# Patient Record
Sex: MALE | Race: WHITE | NOT HISPANIC OR LATINO | Employment: PART TIME | URBAN - METROPOLITAN AREA
[De-identification: names, ages, dates, MRNs, and addresses within clinical notes are randomized per-mention and may not be internally consistent; named-entity substitution may affect disease eponyms.]

---

## 2018-02-15 DIAGNOSIS — E29.1 HYPOGONADISM IN MALE: Primary | ICD-10-CM

## 2018-12-03 LAB
ALBUMIN SERPL-MCNC: 4.6 G/DL (ref 3.6–4.8)
ALBUMIN/GLOB SERPL: 1.9 {RATIO} (ref 1.2–2.2)
ALP SERPL-CCNC: 61 IU/L (ref 39–117)
ALT SERPL-CCNC: 91 IU/L (ref 0–44)
AST SERPL-CCNC: 101 IU/L (ref 0–40)
BILIRUB SERPL-MCNC: 0.8 MG/DL (ref 0–1.2)
BUN SERPL-MCNC: 19 MG/DL (ref 8–27)
BUN/CREAT SERPL: 16 (ref 10–24)
CALCIUM SERPL-MCNC: 11.9 MG/DL (ref 8.6–10.2)
CHLORIDE SERPL-SCNC: 98 MMOL/L (ref 96–106)
CHOLEST SERPL-MCNC: 172 MG/DL (ref 100–199)
CO2 SERPL-SCNC: 27 MMOL/L (ref 20–29)
CREAT SERPL-MCNC: 1.22 MG/DL (ref 0.76–1.27)
GLOBULIN SER-MCNC: 2.4 G/DL (ref 1.5–4.5)
GLUCOSE SERPL-MCNC: 214 MG/DL (ref 65–99)
HCT VFR BLD AUTO: 40.5 % (ref 37.5–51)
HDLC SERPL-MCNC: 78 MG/DL
HGB BLD-MCNC: 14.3 G/DL (ref 13–17.7)
LABCORP COMMENT: NORMAL
LDLC SERPL CALC-MCNC: 79 MG/DL (ref 0–99)
LDLC/HDLC SERPL: 1 RATIO (ref 0–3.6)
POTASSIUM SERPL-SCNC: 4.5 MMOL/L (ref 3.5–5.2)
PROLACTIN SERPL-MCNC: 7.8 NG/ML (ref 4–15.2)
PROT SERPL-MCNC: 7 G/DL (ref 6–8.5)
PSA SERPL-MCNC: 0.6 NG/ML (ref 0–4)
SL AMB EGFR AFRICAN AMERICAN: 70 ML/MIN/1.73
SL AMB EGFR NON AFRICAN AMERICAN: 61 ML/MIN/1.73
SL AMB VLDL CHOLESTEROL CALC: 15 MG/DL (ref 5–40)
SODIUM SERPL-SCNC: 144 MMOL/L (ref 134–144)
TESTOST FREE SERPL-MCNC: 16.4 PG/ML (ref 6.6–18.1)
TESTOST SERPL-MCNC: 421 NG/DL (ref 264–916)
TRIGL SERPL-MCNC: 74 MG/DL (ref 0–149)

## 2018-12-10 ENCOUNTER — TELEPHONE (OUTPATIENT)
Dept: ENDOCRINOLOGY | Facility: HOSPITAL | Age: 68
End: 2018-12-10

## 2018-12-10 NOTE — TELEPHONE ENCOUNTER
[facility]  It looks like elevation in calcium is relatively new finding and we should evaluate it sooner  Can we add him to an 11:50 spot this Thursday or Friday?

## 2018-12-12 ENCOUNTER — OFFICE VISIT (OUTPATIENT)
Dept: ENDOCRINOLOGY | Facility: HOSPITAL | Age: 68
End: 2018-12-12
Payer: MEDICARE

## 2018-12-12 VITALS
WEIGHT: 269.6 LBS | HEART RATE: 104 BPM | SYSTOLIC BLOOD PRESSURE: 132 MMHG | DIASTOLIC BLOOD PRESSURE: 90 MMHG | BODY MASS INDEX: 34.6 KG/M2 | HEIGHT: 74 IN

## 2018-12-12 DIAGNOSIS — Z86.018 HISTORY OF PROLACTINOMA: ICD-10-CM

## 2018-12-12 DIAGNOSIS — E29.1 HYPOGONADISM IN MALE: ICD-10-CM

## 2018-12-12 DIAGNOSIS — E83.52 HYPERCALCEMIA: Primary | ICD-10-CM

## 2018-12-12 DIAGNOSIS — E11.9 TYPE 2 DIABETES MELLITUS WITHOUT COMPLICATION, WITHOUT LONG-TERM CURRENT USE OF INSULIN (HCC): ICD-10-CM

## 2018-12-12 PROCEDURE — 99204 OFFICE O/P NEW MOD 45 MIN: CPT | Performed by: INTERNAL MEDICINE

## 2018-12-12 RX ORDER — TESTOSTERONE 30 MG/1.5ML
SOLUTION TOPICAL
Refills: 0 | COMMUNITY
Start: 2018-09-10 | End: 2018-12-27 | Stop reason: SDUPTHER

## 2018-12-12 RX ORDER — SIMVASTATIN 40 MG
40 TABLET ORAL DAILY
Refills: 2 | COMMUNITY
Start: 2018-10-27

## 2018-12-12 RX ORDER — VALSARTAN AND HYDROCHLOROTHIAZIDE 320; 25 MG/1; MG/1
1 TABLET, FILM COATED ORAL DAILY
Refills: 3 | COMMUNITY
Start: 2018-11-02 | End: 2021-07-19

## 2018-12-12 RX ORDER — ASPIRIN 81 MG/1
81 TABLET ORAL DAILY
COMMUNITY

## 2018-12-12 NOTE — LETTER
December 12, 2018     Juancho Long MD  [address]  [facility]  [address]  [address] [id number]    Patient: Eliceo Roche   YOB: 1950   Date of Visit: 12/12/2018       Dear Dr Theda Cheadle:    Thank you for referring Victorina Hayes to me for evaluation  Below are my notes for this consultation  If you have questions, please do not hesitate to call me  I look forward to following your patient along with you  Sincerely,        Ezequiel Malhotra DO        CC: No Recipients  Ezequiel Malhotra DO  12/12/2018  9:58 AM  Sign at close encounter  12/12/2018    Assessment/Plan      Diagnoses and all orders for this visit:    Hypercalcemia  -     Comprehensive metabolic panel Lab Collect  -     PTH, intact- Lab Collect  -     Vitamin D 25 hydroxy Lab Collect  -     Phosphorus Lab Collect    History of prolactinoma    Hypogonadism in male    Type 2 diabetes mellitus without complication, without long-term current use of insulin (HCC)  -     HEMOGLOBIN A1C W/ EAG ESTIMATION Lab Collect    Other orders  -     metFORMIN (GLUCOPHAGE) 1000 MG tablet; Take 1,000 mg by mouth 2 (two) times a day with meals  -     simvastatin (ZOCOR) 40 mg tablet; Take 40 mg by mouth daily  -     Testosterone 30 MG/ACT SOLN; APPLY 2 DEPRESSIONS OF PUMP UNDER 1 ARM & 1 DEPRESSION OF PUMP UNDER OTHER ARM DAILY, & TITRATE UP A  -     valsartan-hydrochlorothiazide (DIOVAN-HCT) 320-25 MG per tablet; Take 1 tablet by mouth daily  -     aspirin (ECOTRIN LOW STRENGTH) 81 mg EC tablet; Take 81 mg by mouth daily        Assessment/Plan:  1  Hypercalcemia:  Discussed with the patient that we need to define the cause of hypercalcemia  I will ask him to get a CMP, 25 hydroxy vitamin-D, PTH level done soon and we will call with the results  I discussed with him that I a m  suspicious of primary hyperparathyroidism in the setting of his history of prolactinoma, family history of prolactinoma, family history of kidney stones    In this regard, because of the elevation of his recent calcium level and is personal history of kidney stone, if this is the etiology I would likely suggest that he be seen for surgical evaluation as he already would have to surgical indications  He is familiar with Dr Vince Joiner a [address] as his wife had parathyroid surgery through him as well  If this is the cause of his hypercalcemia, we will place a referral and order an imaging test for localization  If it looks non parathyroid mediated, discussed with him that we will need to do further testing in this regard  We will be in touch with him  Also, in the setting of 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 as well as workup for hypercalcemia, depending on the cause of hypercalcemia we may need to consider genetic testing for underlying her hereditary endocrinopathy such as MEN  2   Prolactinoma history:   His prolactin level remains stable off of dopamine agonist therapy  We will plan to monitor this on yearly basis or if symptoms start  3   Hypogonadism:  Clinically and biochemically doing well on testosterone 30 mg per actuation, 2 depressions daily  We will need to repeat blood work at least once a year  4   Type 2 diabetes:  Recent blood sugar was elevated at above 200  With upcoming blood work for Calcium evaluation, I have added an A1c  Based on these results I discussed different options  If it looks appropriate, we will consider adding Jardiance 25 mg daily to his regimen  Discussed the different side effects of Jardiance so he is aware should we be planning to start this medication        CC:   History of pituitary tumor, hypogonadism, hypercalcemia new patient    History of Present Illness     HPI: Dennis Reyes is a 80y o  year old male with history of prolactinoma treated with dopamine agonist therapy for a number years that was stopped several years ago as his prolactin levels did not rise and not August of 2017 MRI showed no evidence of pituitary mass although this study was not done with contrast   He also has a history of hypertension, diabetes, hyperlipidemia, hypogonadism  Hypogonadism did not improve with correction of prolactin so he is maintained on testosterone supplementation  He is also found to have hypercalcemia in recent blood work  He presents today to establish care after follow with doctor Duyen  He presents today for a earlier follow-up appointment as blood work showed hypercalcemia of 11 8  Overall he feels well and denies being told he has hypercalcemia in the past   He does have history of multiple kidney stones with the most recent 1 being over a few years ago  He does endorse a brother who has a history of prolactinoma  He also notes his father and another family member had a history of recurrent kidney stones  He denies any known pancreas or adrenal neoplasia  He denies any fragility fractures or known history of osteopenia or osteoporosis  For hypogonadism he is maintained on 2 depressions of testosterone 30 mg per actuation daily and reports clinically feeling well  He also has history of type 2 diabetes for the past 3-4 years managed on metformin 1000 mg twice a day without any known complications without hypoglycemia  He denies any polyuria polydipsia  He does note that his wife notes that he has room for improvement in terms of diet  I did make an where we have a dietitian here if he is interested in meeting with regard to medical nutrition therapy  Review of Systems   Constitutional: Negative for fatigue  HENT: Negative for trouble swallowing and voice change  Eyes: Negative for visual disturbance  Respiratory: Negative for shortness of breath  Cardiovascular: Negative for palpitations and leg swelling  Gastrointestinal: Negative for abdominal pain, nausea and vomiting  Endocrine: Negative for cold intolerance, heat intolerance, polydipsia and polyuria     Musculoskeletal: Negative for arthralgias and myalgias  Skin: Negative for rash  Neurological: Negative for dizziness, tremors and weakness  Hematological: Negative for adenopathy  Psychiatric/Behavioral: Negative for agitation and confusion  Historical Information   History reviewed  No pertinent past medical history  History reviewed  No pertinent surgical history  Social History   History   Alcohol use Not on file     History   Drug use: Unknown     History   Smoking Status    Former Smoker    Packs/day: 0 50    Types: Cigarettes    Quit date: 2001   Smokeless Tobacco    Never Used     Family History:   Family History   Problem Relation Age of Onset    Breast cancer Mother     Alzheimer's disease Mother     No Known Problems Father     Diabetes unspecified Brother        Meds/Allergies   Current Outpatient Prescriptions   Medication Sig Dispense Refill    aspirin (ECOTRIN LOW STRENGTH) 81 mg EC tablet Take 81 mg by mouth daily      metFORMIN (GLUCOPHAGE) 1000 MG tablet Take 1,000 mg by mouth 2 (two) times a day with meals  1    simvastatin (ZOCOR) 40 mg tablet Take 40 mg by mouth daily  2    Testosterone 30 MG/ACT SOLN APPLY 2 DEPRESSIONS OF PUMP UNDER 1 ARM & 1 DEPRESSION OF PUMP UNDER OTHER ARM DAILY, & TITRATE UP A  0    valsartan-hydrochlorothiazide (DIOVAN-HCT) 320-25 MG per tablet Take 1 tablet by mouth daily  3     No current facility-administered medications for this visit  Allergies   Allergen Reactions    Contrast [Iodinated Diagnostic Agents] Shortness Of Breath and Itching       Objective   Vitals: Blood pressure 132/90, pulse 104, height 6' 2" (1 88 m), weight 122 kg (269 lb 9 6 oz)  Invasive Devices          No matching active lines, drains, or airways          Physical Exam   Constitutional: He is oriented to person, place, and time  He appears well-developed and well-nourished  No distress  HENT:   Head: Normocephalic and atraumatic     Eyes: Pupils are equal, round, and reactive to light  Conjunctivae are normal    Neck: Normal range of motion  Neck supple  No thyromegaly present  Cardiovascular: Normal rate and regular rhythm  No murmur heard  Pulmonary/Chest: Effort normal and breath sounds normal  No respiratory distress  Abdominal: Soft  Bowel sounds are normal  He exhibits no distension  Musculoskeletal: Normal range of motion  He exhibits no edema  Neurological: He is alert and oriented to person, place, and time  He exhibits normal muscle tone  Skin: Skin is warm and dry  No rash noted  He is not diaphoretic  Psychiatric: He has a normal mood and affect  His behavior is normal        The history was obtained from the review of the chart and from the patient      Lab Results:      Recent Results (from the past 17930 hour(s))   Comprehensive metabolic panel    Collection Time: 11/30/18  9:52 AM   Result Value Ref Range    Glucose, Random 214 (H) 65 - 99 mg/dL    BUN 19 8 - 27 mg/dL    Creatinine 1 22 0 76 - 1 27 mg/dL    eGFR Non  61 >59 mL/min/1 73    SL AMB EGFR AFRICAN AMERICAN 70 >59 mL/min/1 73    SL AMB BUN/CREATININE RATIO 16 10 - 24    Sodium 144 134 - 144 mmol/L    Potassium 4 5 3 5 - 5 2 mmol/L    Chloride 98 96 - 106 mmol/L    CO2 27 20 - 29 mmol/L    CALCIUM 11 9 (H) 8 6 - 10 2 mg/dL    SL AMB PROTEIN, TOTAL 7 0 6 0 - 8 5 g/dL    Albumin 4 6 3 6 - 4 8 g/dL    Globulin, Total 2 4 1 5 - 4 5 g/dL    Albumin/Globulin Ratio 1 9 1 2 - 2 2    TOTAL BILIRUBIN 0 8 0 0 - 1 2 mg/dL    Alk Phos Isoenzymes 61 39 - 117 IU/L    SL AMB  (H) 0 - 40 IU/L    SL AMB ALT 91 (H) 0 - 44 IU/L   Lipid Panel with Direct LDL reflex    Collection Time: 11/30/18  9:52 AM   Result Value Ref Range    Cholesterol, Total 172 100 - 199 mg/dL    Triglycerides 74 0 - 149 mg/dL    HDL 78 >39 mg/dL    VLDL Cholesterol Calculated 15 5 - 40 mg/dL    LDL Direct 79 0 - 99 mg/dL    LDl/HDL Ratio 1 0 0 0 - 3 6 ratio   Hemoglobin and hematocrit, blood    Collection Time: 11/30/18 9:52 AM   Result Value Ref Range    Hemoglobin 14 3 13 0 - 17 7 g/dL    HCT 40 5 37 5 - 51 0 %   Testosterone, free, total    Collection Time: 11/30/18  9:52 AM   Result Value Ref Range    TESTOSTERONE TOTAL 421 264 - 916 ng/dL    Testosterone, Free 16 4 6 6 - 18 1 pg/mL   Prolactin    Collection Time: 11/30/18  9:52 AM   Result Value Ref Range    Prolactin 7 8 4 0 - 15 2 ng/mL   PSA Total, Diagnostic    Collection Time: 11/30/18  9:52 AM   Result Value Ref Range    Prostate Specific Antigen Total 0 6 0 0 - 4 0 ng/mL   Specimen Status Report    Collection Time: 11/30/18  9:52 AM   Result Value Ref Range    Comment Comment          No future appointments  Portions of the record may have been created with voice recognition software  Occasional wrong word or "sound a like" substitutions may have occurred due to the inherent limitations of voice recognition software  Read the chart carefully and recognize, using context, where substitutions have occurred

## 2018-12-12 NOTE — PROGRESS NOTES
12/12/2018    Assessment/Plan      Diagnoses and all orders for this visit:    Hypercalcemia  -     Comprehensive metabolic panel Lab Collect  -     PTH, intact- Lab Collect  -     Vitamin D 25 hydroxy Lab Collect  -     Phosphorus Lab Collect    History of prolactinoma    Hypogonadism in male    Type 2 diabetes mellitus without complication, without long-term current use of insulin (HCC)  -     HEMOGLOBIN A1C W/ EAG ESTIMATION Lab Collect    Other orders  -     metFORMIN (GLUCOPHAGE) 1000 MG tablet; Take 1,000 mg by mouth 2 (two) times a day with meals  -     simvastatin (ZOCOR) 40 mg tablet; Take 40 mg by mouth daily  -     Testosterone 30 MG/ACT SOLN; APPLY 2 DEPRESSIONS OF PUMP UNDER 1 ARM & 1 DEPRESSION OF PUMP UNDER OTHER ARM DAILY, & TITRATE UP A  -     valsartan-hydrochlorothiazide (DIOVAN-HCT) 320-25 MG per tablet; Take 1 tablet by mouth daily  -     aspirin (ECOTRIN LOW STRENGTH) 81 mg EC tablet; Take 81 mg by mouth daily        Assessment/Plan:  1  Hypercalcemia:  Discussed with the patient that we need to define the cause of hypercalcemia  I will ask him to get a CMP, 25 hydroxy vitamin-D, PTH level done soon and we will call with the results  I discussed with him that I a m  suspicious of primary hyperparathyroidism in the setting of his history of prolactinoma, family history of prolactinoma, family history of kidney stones  In this regard, because of the elevation of his recent calcium level and is personal history of kidney stone, if this is the etiology I would likely suggest that he be seen for surgical evaluation as he already would have to surgical indications  He is familiar with Dr Malik Hadley a [address] as his wife had parathyroid surgery through him as well  If this is the cause of his hypercalcemia, we will place a referral and order an imaging test for localization    If it looks non parathyroid mediated, discussed with him that we will need to do further testing in this regard  We will be in touch with him  Also, in the setting of 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 as well as workup for hypercalcemia, depending on the cause of hypercalcemia we may need to consider genetic testing for underlying her hereditary endocrinopathy such as MEN  2   Prolactinoma history:   His prolactin level remains stable off of dopamine agonist therapy  We will plan to monitor this on yearly basis or if symptoms start  3   Hypogonadism:  Clinically and biochemically doing well on testosterone 30 mg per actuation, 2 depressions daily  We will need to repeat blood work at least once a year  4   Type 2 diabetes:  Recent blood sugar was elevated at above 200  With upcoming blood work for Calcium evaluation, I have added an A1c  Based on these results I discussed different options  If it looks appropriate, we will consider adding Jardiance 25 mg daily to his regimen  Discussed the different side effects of Jardiance so he is aware should we be planning to start this medication  CC:   History of pituitary tumor, hypogonadism, hypercalcemia new patient    History of Present Illness     HPI: Handy Sapp is a 80y o  year old male with history of prolactinoma treated with dopamine agonist therapy for a number years that was stopped several years ago as his prolactin levels did not rise and not August of 2017 MRI showed no evidence of pituitary mass although this study was not done with contrast   He also has a history of hypertension, diabetes, hyperlipidemia, hypogonadism  Hypogonadism did not improve with correction of prolactin so he is maintained on testosterone supplementation  He is also found to have hypercalcemia in recent blood work  He presents today to establish care after follow with doctor Geller  He presents today for a earlier follow-up appointment as blood work showed hypercalcemia of 11 8    Overall he feels well and denies being told he has hypercalcemia in the past   He does have history of multiple kidney stones with the most recent 1 being over a few years ago  He does endorse a brother who has a history of prolactinoma  He also notes his father and another family member had a history of recurrent kidney stones  He denies any known pancreas or adrenal neoplasia  He denies any fragility fractures or known history of osteopenia or osteoporosis  For hypogonadism he is maintained on 2 depressions of testosterone 30 mg per actuation daily and reports clinically feeling well  He also has history of type 2 diabetes for the past 3-4 years managed on metformin 1000 mg twice a day without any known complications without hypoglycemia  He denies any polyuria polydipsia  He does note that his wife notes that he has room for improvement in terms of diet  I did make an where we have a dietitian here if he is interested in meeting with regard to medical nutrition therapy  Review of Systems   Constitutional: Negative for fatigue  HENT: Negative for trouble swallowing and voice change  Eyes: Negative for visual disturbance  Respiratory: Negative for shortness of breath  Cardiovascular: Negative for palpitations and leg swelling  Gastrointestinal: Negative for abdominal pain, nausea and vomiting  Endocrine: Negative for cold intolerance, heat intolerance, polydipsia and polyuria  Musculoskeletal: Negative for arthralgias and myalgias  Skin: Negative for rash  Neurological: Negative for dizziness, tremors and weakness  Hematological: Negative for adenopathy  Psychiatric/Behavioral: Negative for agitation and confusion  Historical Information   History reviewed  No pertinent past medical history  History reviewed  No pertinent surgical history    Social History   History   Alcohol use Not on file     History   Drug use: Unknown     History   Smoking Status    Former Smoker    Packs/day: 0 50    Types: Cigarettes    Quit date: 2001 Smokeless Tobacco    Never Used     Family History:   Family History   Problem Relation Age of Onset    Breast cancer Mother     Alzheimer's disease Mother     No Known Problems Father     Diabetes unspecified Brother        Meds/Allergies   Current Outpatient Prescriptions   Medication Sig Dispense Refill    aspirin (ECOTRIN LOW STRENGTH) 81 mg EC tablet Take 81 mg by mouth daily      metFORMIN (GLUCOPHAGE) 1000 MG tablet Take 1,000 mg by mouth 2 (two) times a day with meals  1    simvastatin (ZOCOR) 40 mg tablet Take 40 mg by mouth daily  2    Testosterone 30 MG/ACT SOLN APPLY 2 DEPRESSIONS OF PUMP UNDER 1 ARM & 1 DEPRESSION OF PUMP UNDER OTHER ARM DAILY, & TITRATE UP A  0    valsartan-hydrochlorothiazide (DIOVAN-HCT) 320-25 MG per tablet Take 1 tablet by mouth daily  3     No current facility-administered medications for this visit  Allergies   Allergen Reactions    Contrast [Iodinated Diagnostic Agents] Shortness Of Breath and Itching       Objective   Vitals: Blood pressure 132/90, pulse 104, height 6' 2" (1 88 m), weight 122 kg (269 lb 9 6 oz)  Invasive Devices          No matching active lines, drains, or airways          Physical Exam   Constitutional: He is oriented to person, place, and time  He appears well-developed and well-nourished  No distress  HENT:   Head: Normocephalic and atraumatic  Eyes: Pupils are equal, round, and reactive to light  Conjunctivae are normal    Neck: Normal range of motion  Neck supple  No thyromegaly present  Cardiovascular: Normal rate and regular rhythm  No murmur heard  Pulmonary/Chest: Effort normal and breath sounds normal  No respiratory distress  Abdominal: Soft  Bowel sounds are normal  He exhibits no distension  Musculoskeletal: Normal range of motion  He exhibits no edema  Neurological: He is alert and oriented to person, place, and time  He exhibits normal muscle tone  Skin: Skin is warm and dry  No rash noted   He is not diaphoretic  Psychiatric: He has a normal mood and affect  His behavior is normal        The history was obtained from the review of the chart and from the patient      Lab Results:      Recent Results (from the past 24697 hour(s))   Comprehensive metabolic panel    Collection Time: 11/30/18  9:52 AM   Result Value Ref Range    Glucose, Random 214 (H) 65 - 99 mg/dL    BUN 19 8 - 27 mg/dL    Creatinine 1 22 0 76 - 1 27 mg/dL    eGFR Non  61 >59 mL/min/1 73    SL AMB EGFR AFRICAN AMERICAN 70 >59 mL/min/1 73    SL AMB BUN/CREATININE RATIO 16 10 - 24    Sodium 144 134 - 144 mmol/L    Potassium 4 5 3 5 - 5 2 mmol/L    Chloride 98 96 - 106 mmol/L    CO2 27 20 - 29 mmol/L    CALCIUM 11 9 (H) 8 6 - 10 2 mg/dL    SL AMB PROTEIN, TOTAL 7 0 6 0 - 8 5 g/dL    Albumin 4 6 3 6 - 4 8 g/dL    Globulin, Total 2 4 1 5 - 4 5 g/dL    Albumin/Globulin Ratio 1 9 1 2 - 2 2    TOTAL BILIRUBIN 0 8 0 0 - 1 2 mg/dL    Alk Phos Isoenzymes 61 39 - 117 IU/L    SL AMB  (H) 0 - 40 IU/L    SL AMB ALT 91 (H) 0 - 44 IU/L   Lipid Panel with Direct LDL reflex    Collection Time: 11/30/18  9:52 AM   Result Value Ref Range    Cholesterol, Total 172 100 - 199 mg/dL    Triglycerides 74 0 - 149 mg/dL    HDL 78 >39 mg/dL    VLDL Cholesterol Calculated 15 5 - 40 mg/dL    LDL Direct 79 0 - 99 mg/dL    LDl/HDL Ratio 1 0 0 0 - 3 6 ratio   Hemoglobin and hematocrit, blood    Collection Time: 11/30/18  9:52 AM   Result Value Ref Range    Hemoglobin 14 3 13 0 - 17 7 g/dL    HCT 40 5 37 5 - 51 0 %   Testosterone, free, total    Collection Time: 11/30/18  9:52 AM   Result Value Ref Range    TESTOSTERONE TOTAL 421 264 - 916 ng/dL    Testosterone, Free 16 4 6 6 - 18 1 pg/mL   Prolactin    Collection Time: 11/30/18  9:52 AM   Result Value Ref Range    Prolactin 7 8 4 0 - 15 2 ng/mL   PSA Total, Diagnostic    Collection Time: 11/30/18  9:52 AM   Result Value Ref Range    Prostate Specific Antigen Total 0 6 0 0 - 4 0 ng/mL   Specimen Status Report Collection Time: 11/30/18  9:52 AM   Result Value Ref Range    Comment Comment          No future appointments  Portions of the record may have been created with voice recognition software  Occasional wrong word or "sound a like" substitutions may have occurred due to the inherent limitations of voice recognition software  Read the chart carefully and recognize, using context, where substitutions have occurred

## 2018-12-27 DIAGNOSIS — E29.1 HYPOGONADISM IN MALE: Primary | ICD-10-CM

## 2018-12-27 RX ORDER — TESTOSTERONE 30 MG/1.5ML
SOLUTION TOPICAL
Qty: 90 ML | Refills: 1 | Status: SHIPPED | OUTPATIENT
Start: 2018-12-27 | End: 2019-01-02 | Stop reason: SDUPTHER

## 2018-12-27 NOTE — TELEPHONE ENCOUNTER
Meghna Hart pt called for a refill of his Testosterone to go to [facility] in [facility] He has a follow up in June with Meghna Hart  I would send this to him, but if it wont go over electronically I need it signed

## 2019-01-02 DIAGNOSIS — E29.1 HYPOGONADISM IN MALE: ICD-10-CM

## 2019-01-02 RX ORDER — TESTOSTERONE 30 MG/1.5ML
SOLUTION TOPICAL
Qty: 180 ACT | Refills: 1 | Status: SHIPPED | OUTPATIENT
Start: 2019-01-02 | End: 2019-06-18 | Stop reason: SDUPTHER

## 2019-01-03 LAB
25(OH)D3+25(OH)D2 SERPL-MCNC: 31.1 NG/ML (ref 30–100)
ALBUMIN SERPL-MCNC: 4.4 G/DL (ref 3.6–4.8)
ALBUMIN/GLOB SERPL: 1.7 {RATIO} (ref 1.2–2.2)
ALP SERPL-CCNC: 66 IU/L (ref 39–117)
ALT SERPL-CCNC: 72 IU/L (ref 0–44)
AST SERPL-CCNC: 69 IU/L (ref 0–40)
BILIRUB SERPL-MCNC: 0.6 MG/DL (ref 0–1.2)
BUN SERPL-MCNC: 22 MG/DL (ref 8–27)
BUN/CREAT SERPL: 17 (ref 10–24)
CALCIUM SERPL-MCNC: 10.8 MG/DL (ref 8.6–10.2)
CHLORIDE SERPL-SCNC: 98 MMOL/L (ref 96–106)
CO2 SERPL-SCNC: 24 MMOL/L (ref 20–29)
CREAT SERPL-MCNC: 1.27 MG/DL (ref 0.76–1.27)
EST. AVERAGE GLUCOSE BLD GHB EST-MCNC: 171 MG/DL
GLOBULIN SER-MCNC: 2.6 G/DL (ref 1.5–4.5)
GLUCOSE SERPL-MCNC: 194 MG/DL (ref 65–99)
HBA1C MFR BLD: 7.6 % (ref 4.8–5.6)
LABCORP COMMENT: NORMAL
PHOSPHATE SERPL-MCNC: 3.4 MG/DL (ref 2.5–4.5)
POTASSIUM SERPL-SCNC: 4.5 MMOL/L (ref 3.5–5.2)
PROT SERPL-MCNC: 7 G/DL (ref 6–8.5)
PTH-INTACT SERPL-MCNC: 15 PG/ML (ref 15–65)
SL AMB EGFR AFRICAN AMERICAN: 67 ML/MIN/1.73
SL AMB EGFR NON AFRICAN AMERICAN: 58 ML/MIN/1.73
SODIUM SERPL-SCNC: 140 MMOL/L (ref 134–144)

## 2019-01-04 DIAGNOSIS — E83.52 HYPERCALCEMIA: Primary | ICD-10-CM

## 2019-01-04 DIAGNOSIS — E11.9 TYPE 2 DIABETES MELLITUS WITHOUT COMPLICATION, WITHOUT LONG-TERM CURRENT USE OF INSULIN (HCC): ICD-10-CM

## 2019-03-06 LAB
CREAT ?TM UR-SCNC: 278 UMOL/L
EXT MICROALBUMIN URINE RANDOM: 4.5
HBA1C MFR BLD HPLC: 7.7 %
MICROALBUMIN/CREAT UR: 16 MG/G{CREAT}

## 2019-03-25 LAB
1,25(OH)2D3 SERPL-MCNC: 19.9 PG/ML (ref 19.9–79.3)
25(OH)D3+25(OH)D2 SERPL-MCNC: 37.1 NG/ML (ref 30–100)
ACE SERPL-CCNC: 36 U/L (ref 14–82)
ALBUMIN SERPL ELPH-MCNC: 4.1 G/DL (ref 2.9–4.4)
ALBUMIN/GLOB SERPL: 1.4 {RATIO} (ref 0.7–1.7)
ALPHA1 GLOB SERPL ELPH-MCNC: 0.2 G/DL (ref 0–0.4)
ALPHA2 GLOB SERPL ELPH-MCNC: 0.8 G/DL (ref 0.4–1)
B-GLOBULIN SERPL ELPH-MCNC: 1.2 G/DL (ref 0.7–1.3)
CA-I SERPL ISE-MCNC: 5.4 MG/DL (ref 4.5–5.6)
GAMMA GLOB SERPL ELPH-MCNC: 0.8 G/DL (ref 0.4–1.8)
GLOBULIN SER CALC-MCNC: 3 G/DL (ref 2.2–3.9)
LABORATORY COMMENT REPORT: NORMAL
M PROTEIN SERPL ELPH-MCNC: NORMAL G/DL
PROT SERPL-MCNC: 7.1 G/DL (ref 6–8.5)
PTH RELATED PROT SERPL-SCNC: <2 PMOL/L
PTH-INTACT SERPL-MCNC: 18 PG/ML (ref 15–65)
T4 FREE SERPL-MCNC: 1.24 NG/DL (ref 0.82–1.77)
TSH SERPL DL<=0.005 MIU/L-ACNC: 1.72 UIU/ML (ref 0.45–4.5)

## 2019-03-26 LAB
ALBUMIN SERPL-MCNC: 4.6 G/DL (ref 3.6–4.8)
ALBUMIN/GLOB SERPL: 1.8 {RATIO} (ref 1.2–2.2)
ALP SERPL-CCNC: 59 IU/L (ref 39–117)
ALT SERPL-CCNC: 74 IU/L (ref 0–44)
AST SERPL-CCNC: 79 IU/L (ref 0–40)
BILIRUB SERPL-MCNC: 0.4 MG/DL (ref 0–1.2)
BUN SERPL-MCNC: 17 MG/DL (ref 8–27)
BUN/CREAT SERPL: 13 (ref 10–24)
CALCIUM SERPL-MCNC: 10.2 MG/DL (ref 8.6–10.2)
CHLORIDE SERPL-SCNC: 98 MMOL/L (ref 96–106)
CO2 SERPL-SCNC: 27 MMOL/L (ref 20–29)
CREAT SERPL-MCNC: 1.3 MG/DL (ref 0.76–1.27)
GLOBULIN SER-MCNC: 2.5 G/DL (ref 1.5–4.5)
GLUCOSE SERPL-MCNC: 183 MG/DL (ref 65–99)
LABCORP COMMENT: NORMAL
POTASSIUM SERPL-SCNC: 5.1 MMOL/L (ref 3.5–5.2)
PROT SERPL-MCNC: 7.1 G/DL (ref 6–8.5)
SL AMB EGFR AFRICAN AMERICAN: 65 ML/MIN/1.73
SL AMB EGFR NON AFRICAN AMERICAN: 56 ML/MIN/1.73
SODIUM SERPL-SCNC: 143 MMOL/L (ref 134–144)

## 2019-03-27 ENCOUNTER — TELEPHONE (OUTPATIENT)
Dept: ENDOCRINOLOGY | Facility: HOSPITAL | Age: 69
End: 2019-03-27

## 2019-03-27 DIAGNOSIS — E11.9 TYPE 2 DIABETES MELLITUS WITHOUT COMPLICATION, WITHOUT LONG-TERM CURRENT USE OF INSULIN (HCC): ICD-10-CM

## 2019-03-27 DIAGNOSIS — E29.1 HYPOGONADISM IN MALE: ICD-10-CM

## 2019-03-27 DIAGNOSIS — Z86.018 HISTORY OF PROLACTINOMA: ICD-10-CM

## 2019-03-27 DIAGNOSIS — E83.52 HYPERCALCEMIA: Primary | ICD-10-CM

## 2019-03-29 DIAGNOSIS — E11.9 TYPE 2 DIABETES MELLITUS WITHOUT COMPLICATION, WITHOUT LONG-TERM CURRENT USE OF INSULIN (HCC): Primary | ICD-10-CM

## 2019-06-11 LAB
25(OH)D3+25(OH)D2 SERPL-MCNC: 35.7 NG/ML (ref 30–100)
ALBUMIN SERPL-MCNC: 4.7 G/DL (ref 3.6–4.8)
ALBUMIN/GLOB SERPL: 2 {RATIO} (ref 1.2–2.2)
ALP SERPL-CCNC: 59 IU/L (ref 39–117)
ALT SERPL-CCNC: 63 IU/L (ref 0–44)
AST SERPL-CCNC: 59 IU/L (ref 0–40)
BASOPHILS # BLD AUTO: 0 X10E3/UL (ref 0–0.2)
BASOPHILS NFR BLD AUTO: 0 %
BILIRUB SERPL-MCNC: 0.9 MG/DL (ref 0–1.2)
BUN SERPL-MCNC: 31 MG/DL (ref 8–27)
BUN/CREAT SERPL: 22 (ref 10–24)
CALCIUM SERPL-MCNC: 11.4 MG/DL (ref 8.6–10.2)
CHLORIDE SERPL-SCNC: 96 MMOL/L (ref 96–106)
CO2 SERPL-SCNC: 25 MMOL/L (ref 20–29)
CREAT SERPL-MCNC: 1.41 MG/DL (ref 0.76–1.27)
EOSINOPHIL # BLD AUTO: 0.1 X10E3/UL (ref 0–0.4)
EOSINOPHIL NFR BLD AUTO: 1 %
ERYTHROCYTE [DISTWIDTH] IN BLOOD BY AUTOMATED COUNT: 14.3 % (ref 12.3–15.4)
EST. AVERAGE GLUCOSE BLD GHB EST-MCNC: 148 MG/DL
GLOBULIN SER-MCNC: 2.4 G/DL (ref 1.5–4.5)
GLUCOSE SERPL-MCNC: 173 MG/DL (ref 65–99)
HBA1C MFR BLD: 6.8 % (ref 4.8–5.6)
HCT VFR BLD AUTO: 40.7 % (ref 37.5–51)
HGB BLD-MCNC: 13.9 G/DL (ref 13–17.7)
IMM GRANULOCYTES # BLD: 0 X10E3/UL (ref 0–0.1)
IMM GRANULOCYTES NFR BLD: 0 %
LABCORP COMMENT: NORMAL
LYMPHOCYTES # BLD AUTO: 1.7 X10E3/UL (ref 0.7–3.1)
LYMPHOCYTES NFR BLD AUTO: 29 %
MCH RBC QN AUTO: 33.5 PG (ref 26.6–33)
MCHC RBC AUTO-ENTMCNC: 34.2 G/DL (ref 31.5–35.7)
MCV RBC AUTO: 98 FL (ref 79–97)
MONOCYTES # BLD AUTO: 0.8 X10E3/UL (ref 0.1–0.9)
MONOCYTES NFR BLD AUTO: 13 %
NEUTROPHILS # BLD AUTO: 3.3 X10E3/UL (ref 1.4–7)
NEUTROPHILS NFR BLD AUTO: 57 %
PHOSPHATE SERPL-MCNC: 3.5 MG/DL (ref 2.5–4.5)
PLATELET # BLD AUTO: 151 X10E3/UL (ref 150–450)
POTASSIUM SERPL-SCNC: 4.2 MMOL/L (ref 3.5–5.2)
PROLACTIN SERPL-MCNC: 9.5 NG/ML (ref 4–15.2)
PROT SERPL-MCNC: 7.1 G/DL (ref 6–8.5)
PSA SERPL-MCNC: 0.5 NG/ML (ref 0–4)
PTH-INTACT SERPL-MCNC: 12 PG/ML (ref 15–65)
RBC # BLD AUTO: 4.15 X10E6/UL (ref 4.14–5.8)
SL AMB EGFR AFRICAN AMERICAN: 59 ML/MIN/1.73
SL AMB EGFR NON AFRICAN AMERICAN: 51 ML/MIN/1.73
SODIUM SERPL-SCNC: 141 MMOL/L (ref 134–144)
TESTOST FREE SERPL-MCNC: 12.2 PG/ML (ref 6.6–18.1)
TESTOST SERPL-MCNC: 187 NG/DL (ref 264–916)
WBC # BLD AUTO: 5.9 X10E3/UL (ref 3.4–10.8)

## 2019-06-18 ENCOUNTER — OFFICE VISIT (OUTPATIENT)
Dept: ENDOCRINOLOGY | Facility: HOSPITAL | Age: 69
End: 2019-06-18
Payer: MEDICARE

## 2019-06-18 VITALS
SYSTOLIC BLOOD PRESSURE: 112 MMHG | WEIGHT: 265.2 LBS | BODY MASS INDEX: 34.03 KG/M2 | HEART RATE: 108 BPM | HEIGHT: 74 IN | DIASTOLIC BLOOD PRESSURE: 82 MMHG

## 2019-06-18 DIAGNOSIS — E29.1 HYPOGONADISM IN MALE: Primary | ICD-10-CM

## 2019-06-18 DIAGNOSIS — E83.52 HYPERCALCEMIA: ICD-10-CM

## 2019-06-18 DIAGNOSIS — E11.9 TYPE 2 DIABETES MELLITUS WITHOUT COMPLICATION, WITHOUT LONG-TERM CURRENT USE OF INSULIN (HCC): ICD-10-CM

## 2019-06-18 DIAGNOSIS — Z86.018 HISTORY OF PROLACTINOMA: ICD-10-CM

## 2019-06-18 PROCEDURE — 99214 OFFICE O/P EST MOD 30 MIN: CPT | Performed by: INTERNAL MEDICINE

## 2019-06-18 RX ORDER — ESCITALOPRAM OXALATE 10 MG/1
10 TABLET ORAL DAILY
COMMUNITY
Start: 2019-06-17

## 2019-06-18 RX ORDER — BLOOD-GLUCOSE METER
KIT MISCELLANEOUS
Qty: 1 EACH | Refills: 0 | Status: SHIPPED | OUTPATIENT
Start: 2019-06-18 | End: 2019-06-19 | Stop reason: SDUPTHER

## 2019-06-18 RX ORDER — TESTOSTERONE 30 MG/1.5ML
SOLUTION TOPICAL
Qty: 180 ACT | Refills: 1
Start: 2019-06-18 | End: 2019-09-23 | Stop reason: SDUPTHER

## 2019-06-18 RX ORDER — LANCETS 28 GAUGE
EACH MISCELLANEOUS
Qty: 100 EACH | Refills: 5 | Status: SHIPPED | OUTPATIENT
Start: 2019-06-18 | End: 2019-06-19 | Stop reason: SDUPTHER

## 2019-06-19 DIAGNOSIS — E11.9 TYPE 2 DIABETES MELLITUS WITHOUT COMPLICATION, WITHOUT LONG-TERM CURRENT USE OF INSULIN (HCC): ICD-10-CM

## 2019-06-19 RX ORDER — BLOOD-GLUCOSE METER
KIT MISCELLANEOUS
Qty: 1 EACH | Refills: 0 | Status: SHIPPED | OUTPATIENT
Start: 2019-06-19

## 2019-06-19 RX ORDER — LANCETS 28 GAUGE
EACH MISCELLANEOUS
Qty: 100 EACH | Refills: 5 | Status: SHIPPED | OUTPATIENT
Start: 2019-06-19

## 2019-08-02 LAB
ALBUMIN SERPL-MCNC: 4.5 G/DL (ref 3.6–4.8)
ALBUMIN/GLOB SERPL: 1.9 {RATIO} (ref 1.2–2.2)
ALP SERPL-CCNC: 58 IU/L (ref 39–117)
ALT SERPL-CCNC: 49 IU/L (ref 0–44)
AST SERPL-CCNC: 42 IU/L (ref 0–40)
BASOPHILS # BLD AUTO: 0 X10E3/UL (ref 0–0.2)
BASOPHILS NFR BLD AUTO: 0 %
BILIRUB SERPL-MCNC: 0.5 MG/DL (ref 0–1.2)
BUN SERPL-MCNC: 16 MG/DL (ref 8–27)
BUN/CREAT SERPL: 14 (ref 10–24)
CALCIUM SERPL-MCNC: 10.2 MG/DL (ref 8.6–10.2)
CHLORIDE SERPL-SCNC: 99 MMOL/L (ref 96–106)
CO2 SERPL-SCNC: 24 MMOL/L (ref 20–29)
CREAT SERPL-MCNC: 1.18 MG/DL (ref 0.76–1.27)
EOSINOPHIL # BLD AUTO: 0.1 X10E3/UL (ref 0–0.4)
EOSINOPHIL NFR BLD AUTO: 1 %
ERYTHROCYTE [DISTWIDTH] IN BLOOD BY AUTOMATED COUNT: 13.3 % (ref 12.3–15.4)
GLOBULIN SER-MCNC: 2.4 G/DL (ref 1.5–4.5)
GLUCOSE SERPL-MCNC: 165 MG/DL (ref 65–99)
HCT VFR BLD AUTO: 38.2 % (ref 37.5–51)
HGB BLD-MCNC: 13.2 G/DL (ref 13–17.7)
IMM GRANULOCYTES # BLD: 0 X10E3/UL (ref 0–0.1)
IMM GRANULOCYTES NFR BLD: 0 %
LYMPHOCYTES # BLD AUTO: 1.8 X10E3/UL (ref 0.7–3.1)
LYMPHOCYTES NFR BLD AUTO: 28 %
MCH RBC QN AUTO: 33.3 PG (ref 26.6–33)
MCHC RBC AUTO-ENTMCNC: 34.6 G/DL (ref 31.5–35.7)
MCV RBC AUTO: 97 FL (ref 79–97)
MONOCYTES # BLD AUTO: 0.6 X10E3/UL (ref 0.1–0.9)
MONOCYTES NFR BLD AUTO: 9 %
NEUTROPHILS # BLD AUTO: 4.2 X10E3/UL (ref 1.4–7)
NEUTROPHILS NFR BLD AUTO: 62 %
PLATELET # BLD AUTO: 195 X10E3/UL (ref 150–450)
POTASSIUM SERPL-SCNC: 4.1 MMOL/L (ref 3.5–5.2)
PROT SERPL-MCNC: 6.9 G/DL (ref 6–8.5)
RBC # BLD AUTO: 3.96 X10E6/UL (ref 4.14–5.8)
SL AMB EGFR AFRICAN AMERICAN: 73 ML/MIN/1.73
SL AMB EGFR NON AFRICAN AMERICAN: 63 ML/MIN/1.73
SODIUM SERPL-SCNC: 141 MMOL/L (ref 134–144)
TESTOST FREE SERPL-MCNC: 10.9 PG/ML (ref 6.6–18.1)
TESTOST SERPL-MCNC: 249 NG/DL (ref 264–916)
WBC # BLD AUTO: 6.7 X10E3/UL (ref 3.4–10.8)

## 2019-09-19 LAB
ALBUMIN SERPL-MCNC: 4.6 G/DL (ref 3.6–4.8)
ALBUMIN/GLOB SERPL: 2.1 {RATIO} (ref 1.2–2.2)
ALP SERPL-CCNC: 56 IU/L (ref 39–117)
ALT SERPL-CCNC: 63 IU/L (ref 0–44)
AST SERPL-CCNC: 71 IU/L (ref 0–40)
BASOPHILS # BLD AUTO: 0 X10E3/UL (ref 0–0.2)
BASOPHILS NFR BLD AUTO: 0 %
BILIRUB SERPL-MCNC: 0.6 MG/DL (ref 0–1.2)
BUN SERPL-MCNC: 22 MG/DL (ref 8–27)
BUN/CREAT SERPL: 15 (ref 10–24)
CALCIUM SERPL-MCNC: 10.9 MG/DL (ref 8.6–10.2)
CHLORIDE SERPL-SCNC: 96 MMOL/L (ref 96–106)
CO2 SERPL-SCNC: 26 MMOL/L (ref 20–29)
CREAT SERPL-MCNC: 1.48 MG/DL (ref 0.76–1.27)
EOSINOPHIL # BLD AUTO: 0.1 X10E3/UL (ref 0–0.4)
EOSINOPHIL NFR BLD AUTO: 1 %
ERYTHROCYTE [DISTWIDTH] IN BLOOD BY AUTOMATED COUNT: 13.5 % (ref 12.3–15.4)
EST. AVERAGE GLUCOSE BLD GHB EST-MCNC: 146 MG/DL
GLOBULIN SER-MCNC: 2.2 G/DL (ref 1.5–4.5)
GLUCOSE SERPL-MCNC: 168 MG/DL (ref 65–99)
HBA1C MFR BLD: 6.7 % (ref 4.8–5.6)
HCT VFR BLD AUTO: 39.1 % (ref 37.5–51)
HGB BLD-MCNC: 13.5 G/DL (ref 13–17.7)
IMM GRANULOCYTES # BLD: 0 X10E3/UL (ref 0–0.1)
IMM GRANULOCYTES NFR BLD: 0 %
LYMPHOCYTES # BLD AUTO: 1.5 X10E3/UL (ref 0.7–3.1)
LYMPHOCYTES NFR BLD AUTO: 29 %
MCH RBC QN AUTO: 33.7 PG (ref 26.6–33)
MCHC RBC AUTO-ENTMCNC: 34.5 G/DL (ref 31.5–35.7)
MCV RBC AUTO: 98 FL (ref 79–97)
MONOCYTES # BLD AUTO: 0.6 X10E3/UL (ref 0.1–0.9)
MONOCYTES NFR BLD AUTO: 11 %
NEUTROPHILS # BLD AUTO: 3.1 X10E3/UL (ref 1.4–7)
NEUTROPHILS NFR BLD AUTO: 59 %
PLATELET # BLD AUTO: 150 X10E3/UL (ref 150–450)
POTASSIUM SERPL-SCNC: 4.5 MMOL/L (ref 3.5–5.2)
PROT SERPL-MCNC: 6.8 G/DL (ref 6–8.5)
RBC # BLD AUTO: 4.01 X10E6/UL (ref 4.14–5.8)
SL AMB EGFR AFRICAN AMERICAN: 56 ML/MIN/1.73
SL AMB EGFR NON AFRICAN AMERICAN: 48 ML/MIN/1.73
SODIUM SERPL-SCNC: 141 MMOL/L (ref 134–144)
TESTOST FREE SERPL-MCNC: 17.9 PG/ML (ref 6.6–18.1)
TESTOST SERPL-MCNC: 386 NG/DL (ref 264–916)
WBC # BLD AUTO: 5.3 X10E3/UL (ref 3.4–10.8)

## 2019-09-23 ENCOUNTER — OFFICE VISIT (OUTPATIENT)
Dept: ENDOCRINOLOGY | Facility: HOSPITAL | Age: 69
End: 2019-09-23
Payer: MEDICARE

## 2019-09-23 VITALS
WEIGHT: 270.4 LBS | HEART RATE: 122 BPM | HEIGHT: 74 IN | DIASTOLIC BLOOD PRESSURE: 90 MMHG | BODY MASS INDEX: 34.7 KG/M2 | SYSTOLIC BLOOD PRESSURE: 124 MMHG

## 2019-09-23 DIAGNOSIS — E29.1 HYPOGONADISM IN MALE: ICD-10-CM

## 2019-09-23 DIAGNOSIS — E11.9 TYPE 2 DIABETES MELLITUS WITHOUT COMPLICATION, WITHOUT LONG-TERM CURRENT USE OF INSULIN (HCC): Primary | ICD-10-CM

## 2019-09-23 DIAGNOSIS — I10 ESSENTIAL HYPERTENSION: ICD-10-CM

## 2019-09-23 DIAGNOSIS — E83.52 HYPERCALCEMIA: ICD-10-CM

## 2019-09-23 DIAGNOSIS — E78.5 HYPERLIPIDEMIA, UNSPECIFIED HYPERLIPIDEMIA TYPE: ICD-10-CM

## 2019-09-23 DIAGNOSIS — Z86.018 HISTORY OF PROLACTINOMA: ICD-10-CM

## 2019-09-23 PROCEDURE — 99215 OFFICE O/P EST HI 40 MIN: CPT | Performed by: INTERNAL MEDICINE

## 2019-09-23 RX ORDER — TESTOSTERONE 30 MG/1.5ML
SOLUTION TOPICAL
Qty: 180 ACT | Refills: 1 | Status: SHIPPED | OUTPATIENT
Start: 2019-09-23 | End: 2020-06-15

## 2019-09-23 NOTE — PROGRESS NOTES
9/23/2019    Assessment/Plan      Diagnoses and all orders for this visit:    Type 2 diabetes mellitus without complication, without long-term current use of insulin (HCC)  -     Hemoglobin A1C; Future  -     Comprehensive metabolic panel; Future  -     CBC and differential; Future  -     Microalbumin / creatinine urine ratio; Future  -     TSH, 3rd generation; Future    History of prolactinoma  -     Prolactin- Lab Collect; Future    Hypercalcemia  -     Protein electrophoresis, serum Lab Collect  -     Protein electrophoresis, urine Lab Collect  -     Comprehensive metabolic panel Lab Collect  -     PTH, intact- Lab Collect  -     Vitamin D 25 hydroxy Lab Collect  -     Vitamin A  -     Calcium, urine, 24 hour Lab Collect  -     Creatinine, urine, 24 hour Lab Collect    Hypogonadism in male  -     Testosterone, free, total Lab Collect; Future  -     PSA, total screen Lab Collect; Future  -     Testosterone 30 MG/ACT SOLN; 3 pumps total per day  Essential hypertension    Hyperlipidemia, unspecified hyperlipidemia type  -     Lipid Panel with Direct LDL reflex; Future        Assessment/Plan:  1  Type 2 diabetes:  A1c is controlled  He was educated on using the test strips for his specific meter  Asked him to check his blood sugar about 2 or 3 times per week at different times of the day  We will continue to monitor with another set of labs prior to his next appointment which will be in 4 months  2  Hypertension:  Normotensive in the office today on current regimen  3  Hyperlipidemia:  Check lipid panel before next appointment  4  Hypogonadism:  Biochemically and clinically doing better on current regimen  Will repeat labs prior to next appointment  5  History of prolactinoma:  Check prolactin level before next appointment  6  Hypercalcemia: In the past this was PTH independent   1,25 dihydroxy vitamin-D, Ace, PTH RP,, thyroid levels were normal   I have asked him to go for a 24 urine collection for calcium, spep, upep, vitamin a level  We will call to results when they are available  CC: Diabetes Consult    History of Present Illness     HPI: Chan Sebastian is a 80y o  year old male with history of type 2 diabetes for about for 5 years managed on metformin without known complications  He denies polyuria, polydipsia, hypoglycemia  He is maintained on simvastatin 40 mg daily for hyperlipidemia  He also takes valsartan-hydrochlorothiazide 320-25 mg daily for hypertension  He was started on Jardiance at 1 point but discontinue this as it caused lightheadedness and dizziness  He has a history of a prolactinoma treated with dopamine agonist therapy for many years  Dopamine agonist therapy was stopped after prolactin levels were controlled and MRI from August of 2017 showed no evidence of pituitary mass  He continues on testosterone supplementation as his hypogonadism did not improve with correction of prolactin  He continues on testosterone 30 mg per actuation and takes 3 pumps daily  At his last visit we continue to look for causes of hypercalcemia  Workup so far has been unrevealing and has suggested a PTH independent mechanism  He presents today overall feeling well without any new health issues or symptoms of concern  Review of Systems   Constitutional: Negative for fatigue  HENT: Negative for trouble swallowing and voice change  Eyes: Negative for visual disturbance  Respiratory: Negative for shortness of breath  Cardiovascular: Negative for palpitations and leg swelling  Gastrointestinal: Negative for abdominal pain, nausea and vomiting  Endocrine: Negative for polydipsia and polyuria  Musculoskeletal: Negative for arthralgias and myalgias  Skin: Negative for rash  Neurological: Negative for dizziness, tremors and weakness  Hematological: Negative for adenopathy  Psychiatric/Behavioral: Negative for agitation and confusion         Historical Information History reviewed  No pertinent past medical history  History reviewed  No pertinent surgical history  Social History   Social History     Substance and Sexual Activity   Alcohol Use Not on file     Social History     Substance and Sexual Activity   Drug Use Not on file     Social History     Tobacco Use   Smoking Status Former Smoker    Packs/day: 0 50    Types: Cigarettes    Last attempt to quit:     Years since quittin 7   Smokeless Tobacco Never Used     Family History:   Family History   Problem Relation Age of Onset    Breast cancer Mother     Alzheimer's disease Mother     No Known Problems Father     Diabetes unspecified Brother        Meds/Allergies   Current Outpatient Medications   Medication Sig Dispense Refill    aspirin (ECOTRIN LOW STRENGTH) 81 mg EC tablet Take 81 mg by mouth daily      Blood Glucose Monitoring Suppl (FREESTYLE FREEDOM LITE) w/Device KIT Test blood sugar once daily 1 each 0    escitalopram (LEXAPRO) 5 mg tablet Take 5 mg by mouth daily       glucose blood (FREESTYLE LITE) test strip Test blood sugar once daily 100 each 5    Lancets (FREESTYLE) lancets Test blood sugar once daily 100 each 5    metFORMIN (GLUCOPHAGE) 1000 MG tablet Take 1,000 mg by mouth 2 (two) times a day with meals  1    simvastatin (ZOCOR) 40 mg tablet Take 40 mg by mouth daily  2    Testosterone 30 MG/ACT SOLN 3 pumps total per day  180 Act 1    valsartan-hydrochlorothiazide (DIOVAN-HCT) 320-25 MG per tablet Take 1 tablet by mouth daily  3     No current facility-administered medications for this visit  Allergies   Allergen Reactions    Contrast [Iodinated Diagnostic Agents] Shortness Of Breath and Itching       Objective   Vitals: Blood pressure 124/90, pulse (!) 122, height 6' 2" (1 88 m), weight 123 kg (270 lb 6 4 oz)  Invasive Devices     None                 Physical Exam   Constitutional: He is oriented to person, place, and time   He appears well-developed and well-nourished  No distress  HENT:   Head: Normocephalic and atraumatic  Neck: Normal range of motion  Neck supple  No thyromegaly present  Cardiovascular: Normal rate and regular rhythm  Pulses:       Dorsalis pedis pulses are 2+ on the right side, and 2+ on the left side  Posterior tibial pulses are 2+ on the right side, and 2+ on the left side  Pulmonary/Chest: Effort normal and breath sounds normal  No respiratory distress  Abdominal: Soft  Bowel sounds are normal    Musculoskeletal: Normal range of motion  He exhibits no edema  Feet:   Right Foot:   Skin Integrity: Negative for ulcer, skin breakdown, erythema, warmth, callus or dry skin  Left Foot:   Skin Integrity: Negative for ulcer, skin breakdown, erythema, warmth, callus or dry skin  Neurological: He is alert and oriented to person, place, and time  He exhibits normal muscle tone  Skin: Skin is warm and dry  No rash noted  He is not diaphoretic  Psychiatric: He has a normal mood and affect  His behavior is normal    Vitals reviewed  Patient's shoes and socks removed  Right Foot/Ankle   Right Foot Inspection  Skin Exam: skin normal and skin intact no dry skin, no warmth, no callus, no erythema, no maceration, no abnormal color, no pre-ulcer, no ulcer and no callus                            Sensory   Vibration: intact    Monofilament testing: intact  Vascular    The right DP pulse is 2+  The right PT pulse is 2+  Left Foot/Ankle  Left Foot Inspection  Skin Exam: skin normal and skin intactno dry skin, no warmth, no erythema, no maceration, normal color, no pre-ulcer, no ulcer and no callus                                         Sensory   Vibration: intact    Monofilament: intact  Vascular    The left DP pulse is 2+  The left PT pulse is 2+  Assign Risk Category:  No deformity present; No loss of protective sensation;        Risk: 0        The history was obtained from the review of the chart and from the patient      Lab Results:    Most recent Alc is  Lab Results   Component Value Date    HGBA1C 6 7 (H) 09/18/2019           No components found for: HA1C  No components found for: GLU    Lab Results   Component Value Date    CREATININE 1 48 (H) 09/18/2019    CREATININE 1 18 08/01/2019    CREATININE 1 41 (H) 06/10/2019    BUN 22 09/18/2019    K 4 5 09/18/2019    CL 96 09/18/2019    CO2 26 09/18/2019     No results found for: EGFR  No components found for: [facility]    Lab Results   Component Value Date    HDL 78 11/30/2018    TRIG 74 11/30/2018       Lab Results   Component Value Date    ALT 63 (H) 09/18/2019    AST 71 (H) 09/18/2019       Lab Results   Component Value Date    TSH 1 720 03/20/2019    FREET4 1 24 03/20/2019       Recent Results (from the past 27268 hour(s))   Comprehensive metabolic panel    Collection Time: 11/30/18  9:52 AM   Result Value Ref Range    Glucose, Random 214 (H) 65 - 99 mg/dL    BUN 19 8 - 27 mg/dL    Creatinine 1 22 0 76 - 1 27 mg/dL    eGFR Non  61 >59 mL/min/1 73    eGFR  70 >59 mL/min/1 73    SL AMB BUN/CREATININE RATIO 16 10 - 24    Sodium 144 134 - 144 mmol/L    Potassium 4 5 3 5 - 5 2 mmol/L    Chloride 98 96 - 106 mmol/L    CO2 27 20 - 29 mmol/L    CALCIUM 11 9 (H) 8 6 - 10 2 mg/dL    Protein, Total 7 0 6 0 - 8 5 g/dL    Albumin 4 6 3 6 - 4 8 g/dL    Globulin, Total 2 4 1 5 - 4 5 g/dL    Albumin/Globulin Ratio 1 9 1 2 - 2 2    TOTAL BILIRUBIN 0 8 0 0 - 1 2 mg/dL    Alk Phos Isoenzymes 61 39 - 117 IU/L     (H) 0 - 40 IU/L    ALT 91 (H) 0 - 44 IU/L   Lipid Panel with Direct LDL reflex    Collection Time: 11/30/18  9:52 AM   Result Value Ref Range    Cholesterol, Total 172 100 - 199 mg/dL    Triglycerides 74 0 - 149 mg/dL    HDL 78 >39 mg/dL    VLDL Cholesterol Calculated 15 5 - 40 mg/dL    LDL Direct 79 0 - 99 mg/dL    LDl/HDL Ratio 1 0 0 0 - 3 6 ratio   Hemoglobin and hematocrit, blood    Collection Time: 11/30/18  9:52 AM   Result Value Ref Range Hemoglobin 14 3 13 0 - 17 7 g/dL    HCT 40 5 37 5 - 51 0 %   Testosterone, free, total    Collection Time: 11/30/18  9:52 AM   Result Value Ref Range    TESTOSTERONE TOTAL 421 264 - 916 ng/dL    Testosterone, Free 16 4 6 6 - 18 1 pg/mL   Prolactin    Collection Time: 11/30/18  9:52 AM   Result Value Ref Range    Prolactin 7 8 4 0 - 15 2 ng/mL   PSA Total, Diagnostic    Collection Time: 11/30/18  9:52 AM   Result Value Ref Range    Prostate Specific Antigen Total 0 6 0 0 - 4 0 ng/mL   Specimen Status Report    Collection Time: 11/30/18  9:52 AM   Result Value Ref Range    Comment Comment    Comprehensive metabolic panel    Collection Time: 01/02/19  8:49 AM   Result Value Ref Range    Glucose, Random 194 (H) 65 - 99 mg/dL    BUN 22 8 - 27 mg/dL    Creatinine 1 27 0 76 - 1 27 mg/dL    eGFR Non African American 58 (L) >59 mL/min/1 73    eGFR  67 >59 mL/min/1 73    SL AMB BUN/CREATININE RATIO 17 10 - 24    Sodium 140 134 - 144 mmol/L    Potassium 4 5 3 5 - 5 2 mmol/L    Chloride 98 96 - 106 mmol/L    CO2 24 20 - 29 mmol/L    CALCIUM 10 8 (H) 8 6 - 10 2 mg/dL    Protein, Total 7 0 6 0 - 8 5 g/dL    Albumin 4 4 3 6 - 4 8 g/dL    Globulin, Total 2 6 1 5 - 4 5 g/dL    Albumin/Globulin Ratio 1 7 1 2 - 2 2    TOTAL BILIRUBIN 0 6 0 0 - 1 2 mg/dL    Alk Phos Isoenzymes 66 39 - 117 IU/L    AST 69 (H) 0 - 40 IU/L    ALT 72 (H) 0 - 44 IU/L   Hemoglobin A1C    Collection Time: 01/02/19  8:49 AM   Result Value Ref Range    Hemoglobin A1C 7 6 (H) 4 8 - 5 6 %    Estimated Average Glucose 171 mg/dL   Vitamin D 25 hydroxy    Collection Time: 01/02/19  8:49 AM   Result Value Ref Range    25-HYDROXY VIT D 31 1 30 0 - 100 0 ng/mL   Phosphorus    Collection Time: 01/02/19  8:49 AM   Result Value Ref Range    Phosphorus, Serum 3 4 2 5 - 4 5 mg/dL   PTH, intact    Collection Time: 01/02/19  8:49 AM   Result Value Ref Range    PTH, Intact 15 15 - 65 pg/mL   Specimen Status Report    Collection Time: 01/02/19  8:49 AM   Result Value Ref Range    Comment Comment    Hemoglobin A1C    Collection Time: 03/06/19 12:00 AM   Result Value Ref Range    Hemoglobin A1C 7 7    Microalbumin / creatinine urine ratio    Collection Time: 03/06/19 12:00 AM   Result Value Ref Range    EXT Creatinine Urine 278     Microalbum  ,U,Random 4 5     EXTERNAL Microalb/Creat Ratio 16    Protein electrophoresis, serum    Collection Time: 03/20/19 11:44 AM   Result Value Ref Range    Protein, Total 7 1 6 0 - 8 5 g/dL    Albumin, Electrophoresis 4 1 2 9 - 4 4 g/dL    Alpha-1 Globulin 0 2 0 0 - 0 4 g/dL    Alpha-2 Globulin 0 8 0 4 - 1 0 g/dL    Beta Globulin 1 2 0 7 - 1 3 g/dL    Gamma Globulin 0 8 0 4 - 1 8 g/dL    M-Nilay Not Observed Not Observed g/dL    Globulin 3 0 2 2 - 3 9 g/dL    Albumin/Globulin Ratio 1 4 0 7 - 1 7    Please note Comment    VITAMIN D,25-HYDROXY, SERUM    Collection Time: 03/20/19 11:44 AM   Result Value Ref Range    Vit D, 1,25-Dihydroxy 19 9 19 9 - 79 3 pg/mL    25-HYDROXY VIT D 37 1 30 0 - 100 0 ng/mL   PTH-related peptide    Collection Time: 03/20/19 11:44 AM   Result Value Ref Range    PTH-Related Protein <2 0 pmol/L   T4, free    Collection Time: 03/20/19 11:44 AM   Result Value Ref Range    Free t4 1 24 0 82 - 1 77 ng/dL   TSH, 3rd generation    Collection Time: 03/20/19 11:44 AM   Result Value Ref Range    TSH 1 720 0 450 - 4 500 uIU/mL   Angiotensin converting enzyme    Collection Time: 03/20/19 11:44 AM   Result Value Ref Range    Angio Convert Enzyme 36 14 - 82 U/L   Calcium, ionized    Collection Time: 03/20/19 11:44 AM   Result Value Ref Range    Calcium, Ionized, Serum 5 4 4 5 - 5 6 mg/dL   PTH, intact    Collection Time: 03/20/19 11:44 AM   Result Value Ref Range    PTH, Intact 18 15 - 65 pg/mL   Comprehensive metabolic panel    Collection Time: 03/20/19 11:44 AM   Result Value Ref Range    Glucose, Random 183 (H) 65 - 99 mg/dL    BUN 17 8 - 27 mg/dL    Creatinine 1 30 (H) 0 76 - 1 27 mg/dL    eGFR Non  56 (L) >59 mL/min/1 73    eGFR  65 >59 mL/min/1 73    SL AMB BUN/CREATININE RATIO 13 10 - 24    Sodium 143 134 - 144 mmol/L    Potassium 5 1 3 5 - 5 2 mmol/L    Chloride 98 96 - 106 mmol/L    CO2 27 20 - 29 mmol/L    CALCIUM 10 2 8 6 - 10 2 mg/dL    Protein, Total 7 1 6 0 - 8 5 g/dL    Albumin 4 6 3 6 - 4 8 g/dL    Globulin, Total 2 5 1 5 - 4 5 g/dL    Albumin/Globulin Ratio 1 8 1 2 - 2 2    TOTAL BILIRUBIN 0 4 0 0 - 1 2 mg/dL    Alk Phos Isoenzymes 59 39 - 117 IU/L    AST 79 (H) 0 - 40 IU/L    ALT 74 (H) 0 - 44 IU/L   Specimen Status Report    Collection Time: 03/20/19 11:44 AM   Result Value Ref Range    Comment Comment    Denisa Hernandez Default    Collection Time: 06/10/19 10:06 AM   Result Value Ref Range    White Blood Cell Count 5 9 3 4 - 10 8 x10E3/uL    Red Blood Cell Count 4 15 4 14 - 5 80 x10E6/uL    Hemoglobin 13 9 13 0 - 17 7 g/dL    HCT 40 7 37 5 - 51 0 %    MCV 98 (H) 79 - 97 fL    MCH 33 5 (H) 26 6 - 33 0 pg    MCHC 34 2 31 5 - 35 7 g/dL    RDW 14 3 12 3 - 15 4 %    Platelet Count 085 884 - 450 x10E3/uL    Neutrophils 57 Not Estab  %    Lymphocytes 29 Not Estab  %    Monocytes 13 Not Estab  %    Eosinophils 1 Not Estab  %    Basophils PCT 0 Not Estab  %    Neutrophils (Absolute) 3 3 1 4 - 7 0 x10E3/uL    Lymphocytes (Absolute) 1 7 0 7 - 3 1 x10E3/uL    Monocytes (Absolute) 0 8 0 1 - 0 9 x10E3/uL    Eosinophils (Absolute) 0 1 0 0 - 0 4 x10E3/uL    Basophils ABS 0 0 0 0 - 0 2 x10E3/uL    Immature Granulocytes 0 Not Estab  %    Immature Granulocytes (Absolute) 0 0 0 0 - 0 1 x10E3/uL   Comprehensive metabolic panel    Collection Time: 06/10/19 10:06 AM   Result Value Ref Range    Glucose, Random 173 (H) 65 - 99 mg/dL    BUN 31 (H) 8 - 27 mg/dL    Creatinine 1 41 (H) 0 76 - 1 27 mg/dL    eGFR Non  51 (L) >59 mL/min/1 73    eGFR  59 (L) >59 mL/min/1 73    SL AMB BUN/CREATININE RATIO 22 10 - 24    Sodium 141 134 - 144 mmol/L    Potassium 4 2 3 5 - 5 2 mmol/L    Chloride 96 96 - 106 mmol/L    CO2 25 20 - 29 mmol/L    CALCIUM 11 4 (H) 8 6 - 10 2 mg/dL    Protein, Total 7 1 6 0 - 8 5 g/dL    Albumin 4 7 3 6 - 4 8 g/dL    Globulin, Total 2 4 1 5 - 4 5 g/dL    Albumin/Globulin Ratio 2 0 1 2 - 2 2    TOTAL BILIRUBIN 0 9 0 0 - 1 2 mg/dL    Alk Phos Isoenzymes 59 39 - 117 IU/L    AST 59 (H) 0 - 40 IU/L    ALT 63 (H) 0 - 44 IU/L   Hemoglobin A1C    Collection Time: 06/10/19 10:06 AM   Result Value Ref Range    Hemoglobin A1C 6 8 (H) 4 8 - 5 6 %    Estimated Average Glucose 148 mg/dL   Testosterone, free, total    Collection Time: 06/10/19 10:06 AM   Result Value Ref Range    TESTOSTERONE TOTAL 187 (L) 264 - 916 ng/dL    Testosterone, Free 12 2 6 6 - 18 1 pg/mL   Prolactin    Collection Time: 06/10/19 10:06 AM   Result Value Ref Range    Prolactin 9 5 4 0 - 15 2 ng/mL   PSA Total, Diagnostic    Collection Time: 06/10/19 10:06 AM   Result Value Ref Range    Prostate Specific Antigen Total 0 5 0 0 - 4 0 ng/mL   Vitamin D 25 hydroxy    Collection Time: 06/10/19 10:06 AM   Result Value Ref Range    25-HYDROXY VIT D 35 7 30 0 - 100 0 ng/mL   Phosphorus    Collection Time: 06/10/19 10:06 AM   Result Value Ref Range    Phosphorus, Serum 3 5 2 5 - 4 5 mg/dL   PTH, intact    Collection Time: 06/10/19 10:06 AM   Result Value Ref Range    PTH, Intact 12 (L) 15 - 65 pg/mL   Specimen Status Report    Collection Time: 06/10/19 10:06 AM   Result Value Ref Range    Comment Comment    Denisa Hernandez Default    Collection Time: 08/01/19  9:23 AM   Result Value Ref Range    White Blood Cell Count 6 7 3 4 - 10 8 x10E3/uL    Red Blood Cell Count 3 96 (L) 4 14 - 5 80 x10E6/uL    Hemoglobin 13 2 13 0 - 17 7 g/dL    HCT 38 2 37 5 - 51 0 %    MCV 97 79 - 97 fL    MCH 33 3 (H) 26 6 - 33 0 pg    MCHC 34 6 31 5 - 35 7 g/dL    RDW 13 3 12 3 - 15 4 %    Platelet Count 807 414 - 450 x10E3/uL    Neutrophils 62 Not Estab  %    Lymphocytes 28 Not Estab  %    Monocytes 9 Not Estab  %    Eosinophils 1 Not Estab  %    Basophils PCT 0 Not Estab  %    Neutrophils (Absolute) 4 2 1 4 - 7 0 x10E3/uL    Lymphocytes (Absolute) 1 8 0 7 - 3 1 x10E3/uL    Monocytes (Absolute) 0 6 0 1 - 0 9 x10E3/uL    Eosinophils (Absolute) 0 1 0 0 - 0 4 x10E3/uL    Basophils ABS 0 0 0 0 - 0 2 x10E3/uL    Immature Granulocytes 0 Not Estab  %    Immature Granulocytes (Absolute) 0 0 0 0 - 0 1 x10E3/uL   Comprehensive metabolic panel    Collection Time: 08/01/19  9:23 AM   Result Value Ref Range    Glucose, Random 165 (H) 65 - 99 mg/dL    BUN 16 8 - 27 mg/dL    Creatinine 1 18 0 76 - 1 27 mg/dL    eGFR Non African American 63 >59 mL/min/1 73    eGFR  73 >59 mL/min/1 73    SL AMB BUN/CREATININE RATIO 14 10 - 24    Sodium 141 134 - 144 mmol/L    Potassium 4 1 3 5 - 5 2 mmol/L    Chloride 99 96 - 106 mmol/L    CO2 24 20 - 29 mmol/L    CALCIUM 10 2 8 6 - 10 2 mg/dL    Protein, Total 6 9 6 0 - 8 5 g/dL    Albumin 4 5 3 6 - 4 8 g/dL    Globulin, Total 2 4 1 5 - 4 5 g/dL    Albumin/Globulin Ratio 1 9 1 2 - 2 2    TOTAL BILIRUBIN 0 5 0 0 - 1 2 mg/dL    Alk Phos Isoenzymes 58 39 - 117 IU/L    AST 42 (H) 0 - 40 IU/L    ALT 49 (H) 0 - 44 IU/L   Testosterone, free, total    Collection Time: 08/01/19  9:23 AM   Result Value Ref Range    TESTOSTERONE TOTAL 249 (L) 264 - 916 ng/dL    Testosterone, Free 10 9 6 6 - 18 1 pg/mL   Ambig Abbrev Default    Collection Time: 09/18/19  8:44 AM   Result Value Ref Range    White Blood Cell Count 5 3 3 4 - 10 8 x10E3/uL    Red Blood Cell Count 4 01 (L) 4 14 - 5 80 x10E6/uL    Hemoglobin 13 5 13 0 - 17 7 g/dL    HCT 39 1 37 5 - 51 0 %    MCV 98 (H) 79 - 97 fL    MCH 33 7 (H) 26 6 - 33 0 pg    MCHC 34 5 31 5 - 35 7 g/dL    RDW 13 5 12 3 - 15 4 %    Platelet Count 958 766 - 450 x10E3/uL    Neutrophils 59 Not Estab  %    Lymphocytes 29 Not Estab  %    Monocytes 11 Not Estab  %    Eosinophils 1 Not Estab  %    Basophils PCT 0 Not Estab  %    Neutrophils (Absolute) 3 1 1 4 - 7 0 x10E3/uL    Lymphocytes (Absolute) 1 5 0 7 - 3 1 x10E3/uL Monocytes (Absolute) 0 6 0 1 - 0 9 x10E3/uL    Eosinophils (Absolute) 0 1 0 0 - 0 4 x10E3/uL    Basophils ABS 0 0 0 0 - 0 2 x10E3/uL    Immature Granulocytes 0 Not Estab  %    Immature Granulocytes (Absolute) 0 0 0 0 - 0 1 x10E3/uL   Comprehensive metabolic panel    Collection Time: 09/18/19  8:44 AM   Result Value Ref Range    Glucose, Random 168 (H) 65 - 99 mg/dL    BUN 22 8 - 27 mg/dL    Creatinine 1 48 (H) 0 76 - 1 27 mg/dL    eGFR Non  48 (L) >59 mL/min/1 73    eGFR  56 (L) >59 mL/min/1 73    SL AMB BUN/CREATININE RATIO 15 10 - 24    Sodium 141 134 - 144 mmol/L    Potassium 4 5 3 5 - 5 2 mmol/L    Chloride 96 96 - 106 mmol/L    CO2 26 20 - 29 mmol/L    CALCIUM 10 9 (H) 8 6 - 10 2 mg/dL    Protein, Total 6 8 6 0 - 8 5 g/dL    Albumin 4 6 3 6 - 4 8 g/dL    Globulin, Total 2 2 1 5 - 4 5 g/dL    Albumin/Globulin Ratio 2 1 1 2 - 2 2    TOTAL BILIRUBIN 0 6 0 0 - 1 2 mg/dL    Alk Phos Isoenzymes 56 39 - 117 IU/L    AST 71 (H) 0 - 40 IU/L    ALT 63 (H) 0 - 44 IU/L   Hemoglobin A1C    Collection Time: 09/18/19  8:44 AM   Result Value Ref Range    Hemoglobin A1C 6 7 (H) 4 8 - 5 6 %    Estimated Average Glucose 146 mg/dL   Testosterone, free, total    Collection Time: 09/18/19  8:44 AM   Result Value Ref Range    TESTOSTERONE TOTAL 386 264 - 916 ng/dL    Testosterone, Free 17 9 6 6 - 18 1 pg/mL         No future appointments  Portions of the record may have been created with voice recognition software  Occasional wrong word or "sound a like" substitutions may have occurred due to the inherent limitations of voice recognition software  Read the chart carefully and recognize, using context, where substitutions have occurred

## 2019-11-15 LAB
CALCIUM 24H UR-MCNC: 5.1 MG/DL
CALCIUM 24H UR-MRATE: 39.8 MG/24 HR (ref 100–300)
CREAT 24H UR-MRATE: 1402 MG/24 HR (ref 1000–2000)
CREAT UR-MCNC: 179.7 MG/DL

## 2019-11-16 LAB
25(OH)D3+25(OH)D2 SERPL-MCNC: 35.6 NG/ML (ref 30–100)
ALBUMIN MFR UR ELPH: 25.1 %
ALBUMIN SERPL ELPH-MCNC: 3.8 G/DL (ref 2.9–4.4)
ALBUMIN SERPL-MCNC: 4.4 G/DL (ref 3.6–4.8)
ALBUMIN/GLOB SERPL: 1.2 {RATIO} (ref 0.7–1.7)
ALBUMIN/GLOB SERPL: 1.6 {RATIO} (ref 1.2–2.2)
ALP SERPL-CCNC: 59 IU/L (ref 39–117)
ALPHA1 GLOB MFR UR ELPH: 2.4 %
ALPHA1 GLOB SERPL ELPH-MCNC: 0.2 G/DL (ref 0–0.4)
ALPHA2 GLOB MFR UR ELPH: 9.1 %
ALPHA2 GLOB SERPL ELPH-MCNC: 0.8 G/DL (ref 0.4–1)
ALT SERPL-CCNC: 64 IU/L (ref 0–44)
AST SERPL-CCNC: 63 IU/L (ref 0–40)
B-GLOBULIN MFR UR ELPH: 36.6 %
B-GLOBULIN SERPL ELPH-MCNC: 1.3 G/DL (ref 0.7–1.3)
BILIRUB SERPL-MCNC: 0.5 MG/DL (ref 0–1.2)
BUN SERPL-MCNC: 21 MG/DL (ref 8–27)
BUN/CREAT SERPL: 16 (ref 10–24)
CALCIUM SERPL-MCNC: 10.1 MG/DL (ref 8.6–10.2)
CHLORIDE SERPL-SCNC: 98 MMOL/L (ref 96–106)
CO2 SERPL-SCNC: 26 MMOL/L (ref 20–29)
CREAT SERPL-MCNC: 1.28 MG/DL (ref 0.76–1.27)
GAMMA GLOB MFR UR ELPH: 26.9 %
GAMMA GLOB SERPL ELPH-MCNC: 1 G/DL (ref 0.4–1.8)
GLOBULIN SER CALC-MCNC: 3.3 G/DL (ref 2.2–3.9)
GLOBULIN SER-MCNC: 2.7 G/DL (ref 1.5–4.5)
GLUCOSE SERPL-MCNC: 178 MG/DL (ref 65–99)
LABORATORY COMMENT REPORT: NORMAL
LABORATORY COMMENT REPORT: NORMAL
M PROTEIN MFR UR ELPH: NORMAL %
M PROTEIN SERPL ELPH-MCNC: NORMAL G/DL
POTASSIUM SERPL-SCNC: 4.9 MMOL/L (ref 3.5–5.2)
PROT SERPL-MCNC: 7.1 G/DL (ref 6–8.5)
PROT UR-MCNC: 41.6 MG/DL
PTH-INTACT SERPL-MCNC: 15 PG/ML (ref 15–65)
SL AMB EGFR AFRICAN AMERICAN: 66 ML/MIN/1.73
SL AMB EGFR NON AFRICAN AMERICAN: 57 ML/MIN/1.73
SODIUM SERPL-SCNC: 140 MMOL/L (ref 134–144)
VIT A SERPL-MCNC: 93.2 UG/DL (ref 22–69.5)

## 2019-11-19 DIAGNOSIS — E83.52 HYPERCALCEMIA: Primary | ICD-10-CM

## 2020-01-19 LAB
1,25(OH)2D3 SERPL-MCNC: 17.4 PG/ML (ref 19.9–79.3)
25(OH)D3+25(OH)D2 SERPL-MCNC: 24.4 NG/ML (ref 30–100)
ALBUMIN SERPL-MCNC: 4.2 G/DL (ref 3.6–4.8)
ALBUMIN/CREAT UR: 14 MG/G CREAT (ref 0–30)
ALBUMIN/GLOB SERPL: 1.8 {RATIO} (ref 1.2–2.2)
ALP SERPL-CCNC: 54 IU/L (ref 39–117)
ALT SERPL-CCNC: 32 IU/L (ref 0–44)
AST SERPL-CCNC: 33 IU/L (ref 0–40)
BASOPHILS # BLD AUTO: 0 X10E3/UL (ref 0–0.2)
BASOPHILS NFR BLD AUTO: 0 %
BILIRUB SERPL-MCNC: 0.8 MG/DL (ref 0–1.2)
BUN SERPL-MCNC: 17 MG/DL (ref 8–27)
BUN/CREAT SERPL: 14 (ref 10–24)
CALCIUM SERPL-MCNC: 10 MG/DL (ref 8.6–10.2)
CHLORIDE SERPL-SCNC: 96 MMOL/L (ref 96–106)
CHOLEST SERPL-MCNC: 169 MG/DL (ref 100–199)
CO2 SERPL-SCNC: 27 MMOL/L (ref 20–29)
CREAT SERPL-MCNC: 1.21 MG/DL (ref 0.76–1.27)
CREAT UR-MCNC: 254.1 MG/DL
EOSINOPHIL # BLD AUTO: 0.1 X10E3/UL (ref 0–0.4)
EOSINOPHIL NFR BLD AUTO: 1 %
ERYTHROCYTE [DISTWIDTH] IN BLOOD BY AUTOMATED COUNT: 13.6 % (ref 11.6–15.4)
GLOBULIN SER-MCNC: 2.3 G/DL (ref 1.5–4.5)
GLUCOSE SERPL-MCNC: 189 MG/DL (ref 65–99)
HBA1C MFR BLD: 7 % (ref 4.8–5.6)
HCT VFR BLD AUTO: 36.4 % (ref 37.5–51)
HDLC SERPL-MCNC: 70 MG/DL
HGB BLD-MCNC: 12.7 G/DL (ref 13–17.7)
IMM GRANULOCYTES # BLD: 0 X10E3/UL (ref 0–0.1)
IMM GRANULOCYTES NFR BLD: 0 %
LDLC SERPL CALC-MCNC: 82 MG/DL (ref 0–99)
LDLC/HDLC SERPL: 1.2 RATIO (ref 0–3.6)
LYMPHOCYTES # BLD AUTO: 1.5 X10E3/UL (ref 0.7–3.1)
LYMPHOCYTES NFR BLD AUTO: 24 %
MCH RBC QN AUTO: 34.2 PG (ref 26.6–33)
MCHC RBC AUTO-ENTMCNC: 34.9 G/DL (ref 31.5–35.7)
MCV RBC AUTO: 98 FL (ref 79–97)
MICROALBUMIN UR-MCNC: 35.7 UG/ML
MONOCYTES # BLD AUTO: 0.5 X10E3/UL (ref 0.1–0.9)
MONOCYTES NFR BLD AUTO: 8 %
NEUTROPHILS # BLD AUTO: 4.2 X10E3/UL (ref 1.4–7)
NEUTROPHILS NFR BLD AUTO: 67 %
PLATELET # BLD AUTO: 137 X10E3/UL (ref 150–450)
POTASSIUM SERPL-SCNC: 4.2 MMOL/L (ref 3.5–5.2)
PROLACTIN SERPL-MCNC: 8.8 NG/ML (ref 4–15.2)
PROT SERPL-MCNC: 6.5 G/DL (ref 6–8.5)
PSA SERPL-MCNC: 0.5 NG/ML (ref 0–4)
PTH-INTACT SERPL-MCNC: 15 PG/ML (ref 15–65)
RBC # BLD AUTO: 3.71 X10E6/UL (ref 4.14–5.8)
SL AMB EGFR AFRICAN AMERICAN: 71 ML/MIN/1.73
SL AMB EGFR NON AFRICAN AMERICAN: 61 ML/MIN/1.73
SL AMB VLDL CHOLESTEROL CALC: 17 MG/DL (ref 5–40)
SODIUM SERPL-SCNC: 141 MMOL/L (ref 134–144)
TESTOST FREE SERPL-MCNC: 15.2 PG/ML (ref 6.6–18.1)
TESTOST SERPL-MCNC: 196 NG/DL (ref 264–916)
TRIGL SERPL-MCNC: 86 MG/DL (ref 0–149)
TSH SERPL DL<=0.005 MIU/L-ACNC: 1.46 UIU/ML (ref 0.45–4.5)
VIT A SERPL-MCNC: 91.2 UG/DL (ref 22–69.5)
WBC # BLD AUTO: 6.3 X10E3/UL (ref 3.4–10.8)

## 2020-03-03 ENCOUNTER — OFFICE VISIT (OUTPATIENT)
Dept: ENDOCRINOLOGY | Facility: HOSPITAL | Age: 70
End: 2020-03-03
Payer: MEDICARE

## 2020-03-03 VITALS
DIASTOLIC BLOOD PRESSURE: 94 MMHG | SYSTOLIC BLOOD PRESSURE: 136 MMHG | HEIGHT: 74 IN | BODY MASS INDEX: 35.39 KG/M2 | WEIGHT: 275.8 LBS | HEART RATE: 115 BPM

## 2020-03-03 DIAGNOSIS — Z86.018 HISTORY OF PROLACTINOMA: ICD-10-CM

## 2020-03-03 DIAGNOSIS — E78.5 HYPERLIPIDEMIA, UNSPECIFIED HYPERLIPIDEMIA TYPE: ICD-10-CM

## 2020-03-03 DIAGNOSIS — E29.1 HYPOGONADISM IN MALE: ICD-10-CM

## 2020-03-03 DIAGNOSIS — I10 ESSENTIAL HYPERTENSION: ICD-10-CM

## 2020-03-03 DIAGNOSIS — E11.9 TYPE 2 DIABETES MELLITUS WITHOUT COMPLICATION, WITHOUT LONG-TERM CURRENT USE OF INSULIN (HCC): Primary | ICD-10-CM

## 2020-03-03 DIAGNOSIS — E83.52 HYPERCALCEMIA: ICD-10-CM

## 2020-03-03 DIAGNOSIS — N52.9 ERECTILE DYSFUNCTION, UNSPECIFIED ERECTILE DYSFUNCTION TYPE: ICD-10-CM

## 2020-03-03 PROCEDURE — 99214 OFFICE O/P EST MOD 30 MIN: CPT | Performed by: NURSE PRACTITIONER

## 2020-03-03 RX ORDER — HYDROCHLOROTHIAZIDE 25 MG/1
25 TABLET ORAL DAILY
COMMUNITY
Start: 2020-02-05

## 2020-03-03 RX ORDER — VALSARTAN 320 MG/1
320 TABLET ORAL DAILY
COMMUNITY
Start: 2020-02-06 | End: 2021-07-19

## 2020-03-03 RX ORDER — TADALAFIL 20 MG/1
20 TABLET ORAL DAILY PRN
Qty: 10 TABLET | Refills: 0 | Status: SHIPPED | OUTPATIENT
Start: 2020-03-03

## 2020-03-03 RX ORDER — LOSARTAN POTASSIUM 100 MG/1
100 TABLET ORAL DAILY
COMMUNITY
Start: 2020-02-05

## 2020-03-03 NOTE — PATIENT INSTRUCTIONS
Be mindful of diet  Stay active and stay hydrated  Continue metformin at current dose  Continue testosterone topical at current dose  Continue Cialis as needed  Continue simvastatin and valsartan-hydrochlorothiazide  Obtain lab work as prescribed  Start Vitamin D 1000 units daily

## 2020-03-03 NOTE — PROGRESS NOTES
Hugo Wilson 77 y o  male MRN: [id number]    Encounter: [id number]      Assessment/Plan     Assessment: This is a 77y o -year-old male with type 2 diabetes, hypogonadism, hypertension and hyperlipidemia with history of prolactinoma  Plan:  1  Type 2 diabetes:  His most recent hemoglobin A1c is slightly more elevated at 7 0  For now, he will continue metformin at current dose  I have asked him to check his blood sugars regularly and send a record to the office for review  Reviewed the importance of a proper diabetic diet and regular exercise on his type 2 diabetes  Check hemoglobin A1c prior to next visit      2  Hypertension:  Continue current regimen  Check comprehensive metabolic panel prior to next visit      3  Hyperlipidemia:   Stable  Continue simvastatin      4  Hypogonadism:  Free testosterone is normal with a slightly low total testosterone  For now, continue testosterone topical at current dose  He will continue to utilize Cialis as needed  Check testosterone free and total with hemoglobin and hematocrit prior to next visit      5  History of prolactinoma:  Check prolactin level prior to next appointment      6  Hypercalcemia/vitamin-D deficiency:  His most recent PTH, calcium, albumin are normal   His vitamin-D levels are slightly low  I have asked him to start supplementing with 1000 units of vitamin D3 daily  Will continue to monitor        CC:  Type 2 Diabetes follow-up    History of Present Illness     HPI:  77y o  year old male with history of type 2 diabetes for approximately 5 years with no known complications  He is treated with metformin 1000 mg twice daily  His most recent hemoglobin A1c from January 15, 2020 is 7 0  He denies polyuria, polydipsia, hypoglycemia  He was treated with previously but it was discontinued due to lightheadedness and dizziness  He states that he is currently due for his annual diabetic eye exam   He denies any history of retinopathy    His most recent diabetic foot exam was performed on September 23, 2019  He is maintained on simvastatin 40 mg daily for hyperlipidemia  He also takes valsartan 320-25 mg daily for hypertension       He has a history of a prolactinoma treated with dopamine agonist therapy for many years  Dopamine agonist therapy was stopped after prolactin levels were controlled and MRI from August of 2017 showed no evidence of pituitary mass  His most recent prolactin level from January 15, 2020 is 8 8  He continues on testosterone supplementation as his hypogonadism did not improve with correction of prolactin  He continues on testosterone 30 mg per actuation and takes 3 pumps daily  His most recent free testosterone level from January 15, 2020 is 15 2 with a total testosterone of 196       At his last visit we continue to look for causes of hypercalcemia  Workup so far has been unrevealing and has suggested a PTH independent mechanism  His most recent intact PTH from January 15, 2020 is 15 0 with a slightly low 25 hydroxy vitamin-D level of 24 4 and 1, 25 dihydroxy vitamin-D level of 17 4  His most recent calcium from January 15, 2020 is 10 0 with an albumin of 4 2 which corrects to 9 8      He presents today overall feeling well without any new health issues or symptoms of concern         Review of Systems   Constitutional: Negative  Negative for chills, fatigue and fever  HENT: Negative  Negative for trouble swallowing and voice change  Respiratory: Negative for cough and shortness of breath  Cardiovascular: Negative for chest pain and palpitations  Gastrointestinal: Negative for abdominal pain, constipation, diarrhea, nausea and vomiting  Endocrine: Negative for cold intolerance, heat intolerance, polydipsia, polyphagia and polyuria  Genitourinary: Negative  Musculoskeletal: Negative  Skin: Negative  Dry skin to scalp   Allergic/Immunologic: Negative      Neurological: Negative for syncope, light-headedness and headaches  Hematological: Negative  Psychiatric/Behavioral: Negative  All other systems reviewed and are negative  Historical Information   No past medical history on file  No past surgical history on file  Social History   Social History     Substance and Sexual Activity   Alcohol Use Not on file     Social History     Substance and Sexual Activity   Drug Use Not on file     Social History     Tobacco Use   Smoking Status Former Smoker    Packs/day: 0 50    Types: Cigarettes    Last attempt to quit:     Years since quittin 1   Smokeless Tobacco Never Used     Family History:   Family History   Problem Relation Age of Onset    Breast cancer Mother     Alzheimer's disease Mother     No Known Problems Father     Diabetes unspecified Brother        Meds/Allergies   Current Outpatient Medications   Medication Sig Dispense Refill    aspirin (ECOTRIN LOW STRENGTH) 81 mg EC tablet Take 81 mg by mouth daily      Blood Glucose Monitoring Suppl (FREESTYLE FREEDOM LITE) w/Device KIT Test blood sugar once daily 1 each 0    escitalopram (LEXAPRO) 5 mg tablet Take 5 mg by mouth daily       glucose blood (FREESTYLE LITE) test strip Test blood sugar once daily 100 each 5    Lancets (FREESTYLE) lancets Test blood sugar once daily 100 each 5    metFORMIN (GLUCOPHAGE) 1000 MG tablet Take 1,000 mg by mouth 2 (two) times a day with meals  1    simvastatin (ZOCOR) 40 mg tablet Take 40 mg by mouth daily  2    Testosterone 30 MG/ACT SOLN 3 pumps total per day  180 Act 1    valsartan-hydrochlorothiazide (DIOVAN-HCT) 320-25 MG per tablet Take 1 tablet by mouth daily  3     No current facility-administered medications for this visit  Allergies   Allergen Reactions    Contrast [Iodinated Diagnostic Agents] Shortness Of Breath and Itching       Objective   Vitals: There were no vitals taken for this visit      Physical Exam   Constitutional: He is oriented to person, place, and time  He appears well-developed and well-nourished  obese   HENT:   Head: Normocephalic and atraumatic  Nose: Nose normal    Mouth/Throat: Oropharynx is clear and moist    Eyes: Pupils are equal, round, and reactive to light  Conjunctivae and EOM are normal    Neck: Normal range of motion  Neck supple  Cardiovascular: Normal rate, regular rhythm, normal heart sounds and intact distal pulses  Pulmonary/Chest: Effort normal and breath sounds normal    Abdominal: Soft  Bowel sounds are normal    Musculoskeletal: Normal range of motion  Neurological: He is alert and oriented to person, place, and time  Skin: Skin is warm and dry  Dry skin   Psychiatric: He has a normal mood and affect  His behavior is normal  Judgment and thought content normal    Vitals reviewed      Lab Results:   Lab Results   Component Value Date/Time    Hemoglobin A1C 7 0 (H) 01/15/2020 10:18 AM    Hemoglobin A1C 6 7 (H) 09/18/2019 08:44 AM    Hemoglobin A1C 6 8 (H) 06/10/2019 10:06 AM    White Blood Cell Count 6 3 01/15/2020 10:18 AM    White Blood Cell Count 5 3 09/18/2019 08:44 AM    White Blood Cell Count 6 7 08/01/2019 09:23 AM    Hemoglobin 12 7 (L) 01/15/2020 10:18 AM    Hemoglobin 13 5 09/18/2019 08:44 AM    Hemoglobin 13 2 08/01/2019 09:23 AM    HCT 36 4 (L) 01/15/2020 10:18 AM    HCT 39 1 09/18/2019 08:44 AM    HCT 38 2 08/01/2019 09:23 AM    MCV 98 (H) 01/15/2020 10:18 AM    MCV 98 (H) 09/18/2019 08:44 AM    MCV 97 08/01/2019 09:23 AM    Platelet Count 236 (L) 01/15/2020 10:18 AM    Platelet Count 021 13/94/2196 08:44 AM    Platelet Count 121 62/81/2982 09:23 AM    BUN 17 01/15/2020 10:18 AM    BUN 21 11/12/2019 10:12 AM    BUN 22 09/18/2019 08:44 AM    Potassium 4 2 01/15/2020 10:18 AM    Potassium 4 9 11/12/2019 10:12 AM    Potassium 4 5 09/18/2019 08:44 AM    Chloride 96 01/15/2020 10:18 AM    Chloride 98 11/12/2019 10:12 AM    Chloride 96 09/18/2019 08:44 AM    CO2 27 01/15/2020 10:18 AM    CO2 26 11/12/2019 10:12 AM CO2 26 09/18/2019 08:44 AM    Creatinine 1 21 01/15/2020 10:18 AM    Creatinine 1 28 (H) 11/12/2019 10:12 AM    Creatinine 1 48 (H) 09/18/2019 08:44 AM    AST 33 01/15/2020 10:18 AM    AST 63 (H) 11/12/2019 10:12 AM    AST 71 (H) 09/18/2019 08:44 AM    ALT 32 01/15/2020 10:18 AM    ALT 64 (H) 11/12/2019 10:12 AM    ALT 63 (H) 09/18/2019 08:44 AM    Albumin 4 2 01/15/2020 10:18 AM    Albumin 4 4 11/12/2019 10:12 AM    Albumin 4 6 09/18/2019 08:44 AM    Globulin 3 3 11/12/2019 10:12 AM    Globulin 3 0 03/20/2019 11:44 AM    Globulin, Total 2 3 01/15/2020 10:18 AM    Globulin, Total 2 7 11/12/2019 10:12 AM    Globulin, Total 2 2 09/18/2019 08:44 AM    HDL 70 01/15/2020 10:18 AM    Triglycerides 86 01/15/2020 10:18 AM       Portions of the record may have been created with voice recognition software  Occasional wrong word or "sound a like" substitutions may have occurred due to the inherent limitations of voice recognition software  Read the chart carefully and recognize, using context, where substitutions have occurred

## 2020-06-14 DIAGNOSIS — E29.1 HYPOGONADISM IN MALE: ICD-10-CM

## 2020-06-15 RX ORDER — TESTOSTERONE 30 MG/1.5ML
SOLUTION TOPICAL
Qty: 180 ML | Refills: 1 | Status: SHIPPED | OUTPATIENT
Start: 2020-06-15 | End: 2020-09-10

## 2020-07-07 ENCOUNTER — TELEPHONE (OUTPATIENT)
Dept: ENDOCRINOLOGY | Facility: HOSPITAL | Age: 70
End: 2020-07-07

## 2020-07-07 NOTE — TELEPHONE ENCOUNTER
Patient rescheduled for 7-15-20  Faxed lab slip to [employer] [facility] in [facility] [phone])

## 2020-09-10 DIAGNOSIS — E29.1 HYPOGONADISM IN MALE: ICD-10-CM

## 2020-09-10 RX ORDER — TESTOSTERONE 30 MG/1.5ML
SOLUTION TOPICAL
Qty: 180 ML | Refills: 1 | Status: SHIPPED | OUTPATIENT
Start: 2020-09-10 | End: 2021-01-11

## 2020-09-15 LAB
25(OH)D3+25(OH)D2 SERPL-MCNC: 40.2 NG/ML (ref 30–100)
ALBUMIN SERPL-MCNC: 4.5 G/DL (ref 3.8–4.8)
ALBUMIN/GLOB SERPL: 2 {RATIO} (ref 1.2–2.2)
ALP SERPL-CCNC: 50 IU/L (ref 39–117)
ALT SERPL-CCNC: 35 IU/L (ref 0–44)
AST SERPL-CCNC: 34 IU/L (ref 0–40)
BILIRUB SERPL-MCNC: 1 MG/DL (ref 0–1.2)
BUN SERPL-MCNC: 22 MG/DL (ref 8–27)
BUN/CREAT SERPL: 17 (ref 10–24)
CALCIUM SERPL-MCNC: 10.7 MG/DL (ref 8.6–10.2)
CHLORIDE SERPL-SCNC: 94 MMOL/L (ref 96–106)
CO2 SERPL-SCNC: 28 MMOL/L (ref 20–29)
CREAT SERPL-MCNC: 1.26 MG/DL (ref 0.76–1.27)
EST. AVERAGE GLUCOSE BLD GHB EST-MCNC: 123 MG/DL
GLOBULIN SER-MCNC: 2.2 G/DL (ref 1.5–4.5)
GLUCOSE SERPL-MCNC: 155 MG/DL (ref 65–99)
HBA1C MFR BLD: 5.9 % (ref 4.8–5.6)
HCT VFR BLD AUTO: 34 % (ref 37.5–51)
HGB BLD-MCNC: 12.3 G/DL (ref 13–17.7)
PHOSPHATE SERPL-MCNC: 2.4 MG/DL (ref 2.8–4.1)
POTASSIUM SERPL-SCNC: 4 MMOL/L (ref 3.5–5.2)
PROLACTIN SERPL-MCNC: 9.2 NG/ML (ref 4–15.2)
PROT SERPL-MCNC: 6.7 G/DL (ref 6–8.5)
PTH-INTACT SERPL-MCNC: 11 PG/ML (ref 15–65)
SL AMB EGFR AFRICAN AMERICAN: 67 ML/MIN/1.73
SL AMB EGFR NON AFRICAN AMERICAN: 58 ML/MIN/1.73
SODIUM SERPL-SCNC: 139 MMOL/L (ref 134–144)
TESTOST FREE SERPL-MCNC: 12 PG/ML (ref 6.6–18.1)
TESTOST SERPL-MCNC: 283 NG/DL (ref 264–916)
TSH SERPL DL<=0.005 MIU/L-ACNC: 2.06 UIU/ML (ref 0.45–4.5)

## 2020-09-21 ENCOUNTER — OFFICE VISIT (OUTPATIENT)
Dept: ENDOCRINOLOGY | Facility: HOSPITAL | Age: 70
End: 2020-09-21
Payer: MEDICARE

## 2020-09-21 VITALS
HEIGHT: 74 IN | BODY MASS INDEX: 33.34 KG/M2 | HEART RATE: 96 BPM | DIASTOLIC BLOOD PRESSURE: 68 MMHG | SYSTOLIC BLOOD PRESSURE: 128 MMHG | WEIGHT: 259.8 LBS | TEMPERATURE: 97.3 F

## 2020-09-21 DIAGNOSIS — E11.9 TYPE 2 DIABETES MELLITUS WITHOUT COMPLICATION, WITHOUT LONG-TERM CURRENT USE OF INSULIN (HCC): Primary | ICD-10-CM

## 2020-09-21 DIAGNOSIS — I10 ESSENTIAL HYPERTENSION: ICD-10-CM

## 2020-09-21 DIAGNOSIS — Z86.018 HISTORY OF PROLACTINOMA: ICD-10-CM

## 2020-09-21 DIAGNOSIS — E29.1 HYPOGONADISM IN MALE: ICD-10-CM

## 2020-09-21 DIAGNOSIS — E78.5 HYPERLIPIDEMIA, UNSPECIFIED HYPERLIPIDEMIA TYPE: ICD-10-CM

## 2020-09-21 DIAGNOSIS — E83.52 HYPERCALCEMIA: ICD-10-CM

## 2020-09-21 PROCEDURE — 99214 OFFICE O/P EST MOD 30 MIN: CPT | Performed by: PHYSICIAN ASSISTANT

## 2020-09-21 RX ORDER — FLUTICASONE PROPIONATE 50 MCG
1 SPRAY, SUSPENSION (ML) NASAL DAILY
COMMUNITY

## 2020-09-21 NOTE — PATIENT INSTRUCTIONS
Be mindful of diet      Stay active and stay hydrated      Continue metformin at current dose      Continue testosterone topical at current dose      Continue Cialis as needed      Continue simvastatin and valsartan-hydrochlorothiazide      Will call with plan for lab work tomorrow and mail orders

## 2020-09-21 NOTE — PROGRESS NOTES
Tanya Wilson 77 y o  male MRN: [id number]    Encounter: [id number]      Assessment/Plan     Assessment: This is a 77y o -year-old male with type 2 diabetes, hypogonadism, hypertension and hyperlipidemia, hypercalcemia with a history of prolactinoma  Plan:  1  Type 2 diabetes:  His most recent hemoglobin A1c is slightly more elevated at 5 9   at this time continue with diet exercise, will not make any change to his metformin 1000 mg twice a day  Encouraged him to check his fasting blood sugar daily  If he notices a trending upward to contact our office  Check hemoglobin A1c prior to next visit      2  Hypertension:  Continue current regimen  Doing well off the hydrochlorothiazide      3  Hyperlipidemia:   Stable  Continue simvastatin      4  Hypogonadism:  Free free and total testosterone within normal range     For now, continue testosterone topical at current dose  He will continue to utilize Cialis as needed        5  History of prolactinoma:  Check prolactin level prior to next appointment      6  Hypercalcemia/vitamin-D deficiency:  Recent lab work shows a low PTH at 11, normal vitamin-D at 40 2, corrected calcium was slightly elevated at 10 3, and a low phosphorus at 2 4  His previous lab work had also shown a high vitamin-A level which could be a contributing factor into his hypercalcemia  All other previous workups were normal   Will discuss with Dr Chaparrita Santiago for further recommendations on testing and treatment for his hypercalcemia  CC: Diabetes    History of Present Illness     HPI:  77 y  o  year old male with history of type 2 diabetes for approximately 5 years with no known complications  He is treated with metformin 1000 mg twice daily  His most recent hemoglobin A1c from September 14, 2020 is 5 9    He denies polyuria, polydipsia, hypoglycemia   He was treated with previously but it was discontinued due to lightheadedness and dizziness    He states that he is currently due for his annual diabetic eye exam   He denies any history of retinopathy  His most recent diabetic foot exam was performed on September 23, 2019       He is maintained on simvastatin 40 mg daily for hyperlipidemia        He also takes valsartan 320-25 mg daily for hypertension       He has a history of a prolactinoma treated with dopamine agonist therapy for many years   Dopamine agonist therapy was stopped after prolactin levels were controlled and MRI from August of 2017 showed no evidence of pituitary mass  His most recent prolactin level from September 14, 2020 is 9 2   [facility] continues on testosterone supplementation as his hypogonadism did not improve with correction of prolactin   He continues on testosterone 30 mg per actuation and takes 3 pumps daily  His most recent free testosterone level from September 14, 2020 is 12 0 with a total testosterone of 283       At his last visit we continue to look for causes of hypercalcemia   Workup so far has been unrevealing and has suggested a PTH independent mechanism  Vitamin-A levels have been elevated which could be a contributing factor  His most recent intact PTH from September 14, 2020 is 12 0 with 25 hydroxy vitamin-D level of 40 2 and 1, 25 dihydroxy vitamin-D level of 17 4  His most recent calcium from September 14, 2020 is 10 7 with an albumin of 4 5 which corrects to 10 3  Currently denies any fatigue, muscle aches, bone pain, abdominal pain or flank pain, nausea, vomiting      He presents today overall feeling well without any new health issues or symptoms of concern  Review of Systems   Constitutional: Negative for activity change, appetite change, chills, diaphoresis, fatigue and unexpected weight change  HENT: Negative for sore throat, trouble swallowing and voice change  Eyes: Negative for visual disturbance  Respiratory: Negative for chest tightness and shortness of breath      Cardiovascular: Negative for chest pain, palpitations and leg swelling  Gastrointestinal: Negative for abdominal pain, constipation, diarrhea, nausea and vomiting  Endocrine: Negative for cold intolerance, heat intolerance, polydipsia, polyphagia and polyuria  Genitourinary: Negative for flank pain and frequency  Musculoskeletal: Negative for arthralgias and myalgias  Skin: Negative for rash and wound  Neurological: Negative for dizziness, tremors, weakness, light-headedness, numbness and headaches  Hematological: Negative for adenopathy  Does not bruise/bleed easily  Psychiatric/Behavioral: Negative for dysphoric mood and sleep disturbance  The patient is not nervous/anxious  Historical Information   History reviewed  No pertinent past medical history  History reviewed  No pertinent surgical history    Social History   Social History     Substance and Sexual Activity   Alcohol Use None     Social History     Substance and Sexual Activity   Drug Use Not on file     Social History     Tobacco Use   Smoking Status Former Smoker    Packs/day: 0 50    Types: Cigarettes    Last attempt to quit:     Years since quittin 7   Smokeless Tobacco Never Used     Family History:   Family History   Problem Relation Age of Onset    Breast cancer Mother     Alzheimer's disease Mother     No Known Problems Father     Diabetes unspecified Brother        Meds/Allergies   Current Outpatient Medications   Medication Sig Dispense Refill    aspirin (ECOTRIN LOW STRENGTH) 81 mg EC tablet Take 81 mg by mouth daily      Blood Glucose Monitoring Suppl (FREESTYLE FREEDOM LITE) w/Device KIT Test blood sugar once daily 1 each 0    escitalopram (LEXAPRO) 5 mg tablet Take 10 mg by mouth daily       fluticasone (FLONASE) 50 mcg/act nasal spray 1 spray into each nostril daily      glucose blood (FREESTYLE LITE) test strip Test blood sugar once daily 100 each 5    hydrochlorothiazide (HYDRODIURIL) 25 mg tablet Take 25 mg by mouth daily      Lancets (FREESTYLE) lancets Test blood sugar once daily 100 each 5    losartan (COZAAR) 100 MG tablet Take 100 mg by mouth daily      metFORMIN (GLUCOPHAGE) 1000 MG tablet Take 1,000 mg by mouth 2 (two) times a day with meals  1    simvastatin (ZOCOR) 40 mg tablet Take 40 mg by mouth daily  2    Testosterone 30 MG/ACT SOLN APPLY 3 PUMPS TOTAL PER DAY  180 mL 1    tadalafil (CIALIS) 20 MG tablet Take 1 tablet (20 mg total) by mouth daily as needed for erectile dysfunction (Patient not taking: Reported on 9/21/2020) 10 tablet 0    valsartan (DIOVAN) 320 MG tablet Take 320 mg by mouth daily      valsartan-hydrochlorothiazide (DIOVAN-HCT) 320-25 MG per tablet Take 1 tablet by mouth daily  3     No current facility-administered medications for this visit  Allergies   Allergen Reactions    Contrast [Iodinated Diagnostic Agents] Shortness Of Breath and Itching       Objective   Vitals: Blood pressure 128/68, pulse 96, temperature (!) 97 3 °F (36 3 °C), height 6' 2" (1 88 m), weight 118 kg (259 lb 12 8 oz)  Physical Exam  Vitals signs and nursing note reviewed  Constitutional:       General: He is not in acute distress  Appearance: Normal appearance  He is not diaphoretic  HENT:      Head: Normocephalic and atraumatic  Eyes:      General: No scleral icterus  Extraocular Movements: Extraocular movements intact  Conjunctiva/sclera: Conjunctivae normal       Pupils: Pupils are equal, round, and reactive to light  Neck:      Musculoskeletal: Normal range of motion  Thyroid: No thyroid mass, thyromegaly or thyroid tenderness  Cardiovascular:      Rate and Rhythm: Normal rate and regular rhythm  Heart sounds: No murmur  Pulmonary:      Effort: Pulmonary effort is normal  No respiratory distress  Breath sounds: Normal breath sounds  No wheezing  Musculoskeletal:      Right lower leg: No edema  Left lower leg: No edema  Lymphadenopathy:      Cervical: No cervical adenopathy     Skin: General: Skin is warm and dry  Findings: No rash  Neurological:      Mental Status: He is alert and oriented to person, place, and time  Mental status is at baseline  Sensory: No sensory deficit  Deep Tendon Reflexes:      Reflex Scores:       Patellar reflexes are 2+ on the right side and 2+ on the left side  Psychiatric:         Mood and Affect: Mood normal          Behavior: Behavior normal          Thought Content: Thought content normal          The history was obtained from the review of the chart, patient      Lab Results:   Lab Results   Component Value Date/Time    Hemoglobin A1C 5 9 (H) 09/14/2020 11:41 AM    Hemoglobin A1C 7 0 (H) 01/15/2020 10:18 AM    White Blood Cell Count 6 3 01/15/2020 10:18 AM    Hemoglobin 12 3 (L) 09/14/2020 11:41 AM    Hemoglobin 12 7 (L) 01/15/2020 10:18 AM    HCT 34 0 (L) 09/14/2020 11:41 AM    HCT 36 4 (L) 01/15/2020 10:18 AM    MCV 98 (H) 01/15/2020 10:18 AM    Platelet Count 136 (L) 01/15/2020 10:18 AM    BUN 22 09/14/2020 11:41 AM    BUN 17 01/15/2020 10:18 AM    BUN 21 11/12/2019 10:12 AM    Potassium 4 0 09/14/2020 11:41 AM    Potassium 4 2 01/15/2020 10:18 AM    Potassium 4 9 11/12/2019 10:12 AM    Chloride 94 (L) 09/14/2020 11:41 AM    Chloride 96 01/15/2020 10:18 AM    Chloride 98 11/12/2019 10:12 AM    CO2 28 09/14/2020 11:41 AM    CO2 27 01/15/2020 10:18 AM    CO2 26 11/12/2019 10:12 AM    Creatinine 1 26 09/14/2020 11:41 AM    Creatinine 1 21 01/15/2020 10:18 AM    Creatinine 1 28 (H) 11/12/2019 10:12 AM    AST 34 09/14/2020 11:41 AM    AST 33 01/15/2020 10:18 AM    AST 63 (H) 11/12/2019 10:12 AM    ALT 35 09/14/2020 11:41 AM    ALT 32 01/15/2020 10:18 AM    ALT 64 (H) 11/12/2019 10:12 AM    Albumin 4 5 09/14/2020 11:41 AM    Albumin 4 2 01/15/2020 10:18 AM    Albumin 4 4 11/12/2019 10:12 AM    Globulin 3 3 11/12/2019 10:12 AM    Globulin, Total 2 2 09/14/2020 11:41 AM    Globulin, Total 2 3 01/15/2020 10:18 AM    Globulin, Total 2 7 11/12/2019 10:12 AM    HDL 70 01/15/2020 10:18 AM    Triglycerides 86 01/15/2020 10:18 AM       Portions of the record may have been created with voice recognition software  Occasional wrong word or "sound a like" substitutions may have occurred due to the inherent limitations of voice recognition software  Read the chart carefully and recognize, using context, where substitutions have occurred

## 2020-09-22 ENCOUNTER — TELEPHONE (OUTPATIENT)
Dept: ENDOCRINOLOGY | Facility: HOSPITAL | Age: 70
End: 2020-09-22

## 2020-09-22 DIAGNOSIS — Z86.018 HISTORY OF PROLACTINOMA: ICD-10-CM

## 2020-09-22 DIAGNOSIS — E29.1 HYPOGONADISM IN MALE: Primary | ICD-10-CM

## 2020-09-22 DIAGNOSIS — E83.52 HYPERCALCEMIA: ICD-10-CM

## 2020-09-22 DIAGNOSIS — E11.9 TYPE 2 DIABETES MELLITUS WITHOUT COMPLICATION, WITHOUT LONG-TERM CURRENT USE OF INSULIN (HCC): ICD-10-CM

## 2021-01-09 DIAGNOSIS — E29.1 HYPOGONADISM IN MALE: ICD-10-CM

## 2021-01-11 RX ORDER — TESTOSTERONE 30 MG/1.5ML
SOLUTION TOPICAL
Qty: 180 ML | Refills: 1 | Status: SHIPPED | OUTPATIENT
Start: 2021-01-11 | End: 2021-07-19 | Stop reason: SDUPTHER

## 2021-01-21 ENCOUNTER — DOCUMENTATION (OUTPATIENT)
Dept: ENDOCRINOLOGY | Facility: HOSPITAL | Age: 71
End: 2021-01-21

## 2021-01-21 NOTE — PROGRESS NOTES
Called [phone] and completed the prior auth for pt's Testosterone  This was approved till 01/21/2022  Case ID [id number]  Pt aware this is approved  Corrinne Ambrosia #[id number]  PCN#[id number]  TV#[id number]

## 2021-01-26 ENCOUNTER — TELEPHONE (OUTPATIENT)
Dept: ENDOCRINOLOGY | Facility: HOSPITAL | Age: 71
End: 2021-01-26

## 2021-01-26 NOTE — TELEPHONE ENCOUNTER
Before make a decision about the lab work, why has he not taking in his testosterone in the last 3 weeks?

## 2021-01-26 NOTE — TELEPHONE ENCOUNTER
Patient called about getting his fasting labs  He hasn't had his Testerone in 3 weeks and has an appt on Friday for labs but they said if he wants "fasting" it would be march (I believe he might be able to do a walk in sooner)   Please advise about labs

## 2021-01-26 NOTE — TELEPHONE ENCOUNTER
Spoke to patient   He said that has not been using the testosterone because the pharmacy will not renew it

## 2021-01-26 NOTE — TELEPHONE ENCOUNTER
It looks like a prior authorization for the testosterone was approved  He should be able to pick it up at the pharmacy  Once he restarts his testosterone, it will not have to be done in the morning  The rest of the lab work does not necessarily need to be fasting  Of course if he has been taking his testosterone for about a month, he should wait about a month before getting his testosterone checked

## 2021-02-12 NOTE — TELEPHONE ENCOUNTER
Patient started taking the testosterone about a week and a half - two weeks ago  Told him give it a month total before he gets his blood work done  Scheduled appointment for 3-2-21

## 2021-03-04 LAB
1,25(OH)2D3 SERPL-MCNC: 24.8 PG/ML (ref 19.9–79.3)
25(OH)D3+25(OH)D2 SERPL-MCNC: 37.2 NG/ML (ref 30–100)
ALBUMIN SERPL-MCNC: 4.3 G/DL (ref 3.8–4.8)
ALBUMIN/GLOB SERPL: 1.7 {RATIO} (ref 1.2–2.2)
ALP SERPL-CCNC: 68 IU/L (ref 39–117)
ALT SERPL-CCNC: 69 IU/L (ref 0–44)
AST SERPL-CCNC: 65 IU/L (ref 0–40)
BASOPHILS # BLD AUTO: 0 X10E3/UL (ref 0–0.2)
BASOPHILS NFR BLD AUTO: 1 %
BILIRUB SERPL-MCNC: 0.6 MG/DL (ref 0–1.2)
BUN SERPL-MCNC: 23 MG/DL (ref 8–27)
BUN/CREAT SERPL: 16 (ref 10–24)
CALCIUM SERPL-MCNC: 10 MG/DL (ref 8.6–10.2)
CHLORIDE SERPL-SCNC: 98 MMOL/L (ref 96–106)
CO2 SERPL-SCNC: 30 MMOL/L (ref 20–29)
CREAT SERPL-MCNC: 1.41 MG/DL (ref 0.76–1.27)
EOSINOPHIL # BLD AUTO: 0.1 X10E3/UL (ref 0–0.4)
EOSINOPHIL NFR BLD AUTO: 2 %
ERYTHROCYTE [DISTWIDTH] IN BLOOD BY AUTOMATED COUNT: 12.7 % (ref 11.6–15.4)
GLOBULIN SER-MCNC: 2.6 G/DL (ref 1.5–4.5)
GLUCOSE SERPL-MCNC: 171 MG/DL (ref 65–99)
HBA1C MFR BLD: 6.5 % (ref 4.8–5.6)
HCT VFR BLD AUTO: 34.1 % (ref 37.5–51)
HGB BLD-MCNC: 12.6 G/DL (ref 13–17.7)
IMM GRANULOCYTES # BLD: 0.1 X10E3/UL (ref 0–0.1)
IMM GRANULOCYTES NFR BLD: 1 %
LYMPHOCYTES # BLD AUTO: 1.6 X10E3/UL (ref 0.7–3.1)
LYMPHOCYTES NFR BLD AUTO: 28 %
MCH RBC QN AUTO: 35.9 PG (ref 26.6–33)
MCHC RBC AUTO-ENTMCNC: 37 G/DL (ref 31.5–35.7)
MCV RBC AUTO: 97 FL (ref 79–97)
MONOCYTES # BLD AUTO: 0.5 X10E3/UL (ref 0.1–0.9)
MONOCYTES NFR BLD AUTO: 8 %
MORPHOLOGY BLD-IMP: ABNORMAL
NEUTROPHILS # BLD AUTO: 3.3 X10E3/UL (ref 1.4–7)
NEUTROPHILS NFR BLD AUTO: 60 %
PHOSPHATE SERPL-MCNC: 2.5 MG/DL (ref 2.8–4.1)
PLATELET # BLD AUTO: 166 X10E3/UL (ref 150–450)
POTASSIUM SERPL-SCNC: 4.5 MMOL/L (ref 3.5–5.2)
PROLACTIN SERPL-MCNC: 9.8 NG/ML (ref 4–15.2)
PROT SERPL-MCNC: 6.9 G/DL (ref 6–8.5)
PTH-INTACT SERPL-MCNC: 17 PG/ML (ref 15–65)
RBC # BLD AUTO: 3.51 X10E6/UL (ref 4.14–5.8)
SL AMB EGFR AFRICAN AMERICAN: 58 ML/MIN/1.73
SL AMB EGFR NON AFRICAN AMERICAN: 50 ML/MIN/1.73
SODIUM SERPL-SCNC: 141 MMOL/L (ref 134–144)
TESTOST FREE SERPL-MCNC: 8.9 PG/ML (ref 6.6–18.1)
TESTOST SERPL-MCNC: 195 NG/DL (ref 264–916)
WBC # BLD AUTO: 5.6 X10E3/UL (ref 3.4–10.8)

## 2021-03-09 ENCOUNTER — TELEPHONE (OUTPATIENT)
Dept: ADMINISTRATIVE | Facility: OTHER | Age: 71
End: 2021-03-09

## 2021-03-09 ENCOUNTER — OFFICE VISIT (OUTPATIENT)
Dept: ENDOCRINOLOGY | Facility: HOSPITAL | Age: 71
End: 2021-03-09
Payer: MEDICARE

## 2021-03-09 VITALS
HEART RATE: 112 BPM | DIASTOLIC BLOOD PRESSURE: 80 MMHG | HEIGHT: 74 IN | SYSTOLIC BLOOD PRESSURE: 114 MMHG | WEIGHT: 270.6 LBS | BODY MASS INDEX: 34.73 KG/M2

## 2021-03-09 DIAGNOSIS — E11.9 TYPE 2 DIABETES MELLITUS WITHOUT COMPLICATION, WITHOUT LONG-TERM CURRENT USE OF INSULIN (HCC): Primary | ICD-10-CM

## 2021-03-09 DIAGNOSIS — Z86.018 HISTORY OF PROLACTINOMA: ICD-10-CM

## 2021-03-09 DIAGNOSIS — E78.5 HYPERLIPIDEMIA, UNSPECIFIED HYPERLIPIDEMIA TYPE: ICD-10-CM

## 2021-03-09 DIAGNOSIS — I10 ESSENTIAL HYPERTENSION: ICD-10-CM

## 2021-03-09 DIAGNOSIS — E29.1 HYPOGONADISM IN MALE: ICD-10-CM

## 2021-03-09 DIAGNOSIS — E83.52 HYPERCALCEMIA: ICD-10-CM

## 2021-03-09 PROCEDURE — 99214 OFFICE O/P EST MOD 30 MIN: CPT | Performed by: PHYSICIAN ASSISTANT

## 2021-03-09 RX ORDER — FAMOTIDINE 40 MG/1
40 TABLET, FILM COATED ORAL EVERY 12 HOURS
COMMUNITY
Start: 2021-02-20

## 2021-03-09 NOTE — TELEPHONE ENCOUNTER
Upon review of the In Basket request and the patient's chart, initial outreach has been made via fax, please see Contacts section for details       Thank you  Ezra Moreno

## 2021-03-09 NOTE — LETTER
Procedure Request Form: Colonoscopy      Date Requested: 21  Patient: [place]  Patient : [id number]   Referring Provider: Effie Kell, MD        Date of Procedure ______________________________       The above patient has informed us that they have completed their   most recent Colonoscopy at your facility  Please complete   this form and attach all corresponding procedure reports/results  Comments __________________________________________________________  ____________________________________________________________________  ____________________________________________________________________  ____________________________________________________________________    Facility Completing Procedure _________________________________________    Form Completed By (print name) _______________________________________      Signature __________________________________________________________      These reports are needed for  compliance    Please fax this completed form and a copy of the procedure report to our office located at [place] [address] as soon as possible to [phone] chrissy Gibbons: Phone [phone]    We thank you for your assistance in treating our mutual patient

## 2021-03-09 NOTE — TELEPHONE ENCOUNTER
----- Message from Micheal Olson sent at 3/9/2021  9:39 AM EST -----  Regarding: colonoscopy  03/09/21 9:42 AM    Hello, our patient Alejandro Slaughter has had a colonoscopy completed/performed  Please assist in updating the [facility] chart [facility]  The date of service is [id number]      Thank you,  Melissa Gage MA  [facility]

## 2021-03-09 NOTE — PATIENT INSTRUCTIONS
Be mindful of diet      Stay active and stay hydrated      Continue metformin at current dose      Increase testosterone to 4 pumps a day  Call insurance to see if there is a cheaper option      Continue Cialis as needed      Continue simvastatin and valsartan-hydrochlorothiazide      Will call with plan for lab work tomorrow and mail orders

## 2021-03-09 NOTE — PROGRESS NOTES
Dulce Wilson 72 y o  male MRN: [id number]    Encounter: [id number]      Assessment/Plan     Assessment: This is a 72y o -year-old male with type 2 diabetes, hypogonadism, hypertension and hyperlipidemia, hypercalcemia with a history of prolactinoma  Plan:  1  Type 2 diabetes:   Most recent hemoglobin A1c has increased to 6 5%  At this time will continue with metformin 1000 mg twice a day  At this time strongly encourage lifestyle modifications to lower blood sugar, and prevent further increase his A1c  Diabetic exam was completed today  Follow-up in 3 months with lab work completed prior to visit      2  Hypertension:  Normotensive in the office today  Continue with losartan      3  Hyperlipidemia:   Stable   Continue simvastatin      4  Hypogonadism:   Total testosterone levels were low, and free testosterone levels were at the low end of normal   Will increase testosterone to 2 problems in each arm daily      5  History of prolactinoma:  Prolactin levels within normal range  Will continue to monitor at this time      6  Hypercalcemia/vitamin-D deficiency: calcium and vitamin-D levels were normal   Will continue to monitor at this time  Of note phosphorus levels were slightly low, as rest the lab work was normal, likely due to diet  Discussed lifestyle changes to help increase levels  Will continue to monitor at this time  CC:  Type 2 diabetes and hypogonadism follow-up    History of Present Illness     HPI:  78 year old male with history of type 2 diabetes for approximately 5 years with no known complications  He is treated with metformin 1000 mg twice daily   His most recent hemoglobin A1c from from March 2, 2021 was 6  5    He denies polyuria, polydipsia, hypoglycemia   He was treated with previously but it was discontinued due to lightheadedness and dizziness   He states that he is currently due for his annual diabetic eye exam  [facility] denies any history of retinopathy   His most recent diabetic foot exam was performed on September 23, 2019  He does admit to poor diet, and decreased physical activity since his last appointment  Knows that he needs to lose weight to help control his blood sugar levels       He is maintained on simvastatin 40 mg daily for hyperlipidemia        He also takes losartan 100 mg and hydrochlorothiazide 25 mg daily for hypertension       He has a history of a prolactinoma treated with dopamine agonist therapy for many years   Dopamine agonist therapy was stopped after prolactin levels were controlled and MRI from August of 2017 showed no evidence of pituitary mass  His most recent prolactin level from March 2, 2021 was 9 8    He continues on testosterone supplementation as his hypogonadism did not improve with correction of prolactin   He continues on testosterone 30 mg per actuation and takes 3 pumps daily   His most recent free testosterone level from March 2, 2021 is 8 9 with a total testosterone of 195       At his last visit we continue to look for causes of hypercalcemia   Workup so far has been unrevealing and has suggested a PTH independent mechanism  Vitamin-A levels have been elevated which could be a contributing factor  Lab work from March 2, 2021 reveals a calcium level of 10 0  Currently denies any fatigue, muscle aches, bone pain, abdominal pain or flank pain, nausea, vomiting      He presents today overall feeling well without any new health issues or symptoms of concern  Review of Systems   Constitutional: Negative for activity change, appetite change, chills, diaphoresis, fatigue and unexpected weight change  HENT: Negative for sore throat, trouble swallowing and voice change  Eyes: Negative for visual disturbance  Respiratory: Negative for chest tightness and shortness of breath  Cardiovascular: Negative for chest pain, palpitations and leg swelling  Gastrointestinal: Negative for abdominal pain, constipation, diarrhea, nausea and vomiting  Endocrine: Negative for cold intolerance, heat intolerance, polydipsia, polyphagia and polyuria  Genitourinary: Negative for flank pain and frequency  Musculoskeletal: Negative for arthralgias and myalgias  Skin: Negative for rash and wound  Neurological: Negative for dizziness, tremors, weakness, light-headedness, numbness and headaches  Hematological: Negative for adenopathy  Does not bruise/bleed easily  Psychiatric/Behavioral: Negative for dysphoric mood and sleep disturbance  The patient is not nervous/anxious  Historical Information   History reviewed  No pertinent past medical history  History reviewed  No pertinent surgical history    Social History   Social History     Substance and Sexual Activity   Alcohol Use None     Social History     Substance and Sexual Activity   Drug Use Not on file     Social History     Tobacco Use   Smoking Status Former Smoker    Packs/day: 0 50    Types: Cigarettes    Quit date:     Years since quittin 1   Smokeless Tobacco Never Used     Family History:   Family History   Problem Relation Age of Onset    Breast cancer Mother     Alzheimer's disease Mother     No Known Problems Father     Diabetes unspecified Brother        Meds/Allergies   Current Outpatient Medications   Medication Sig Dispense Refill    aspirin (ECOTRIN LOW STRENGTH) 81 mg EC tablet Take 81 mg by mouth daily      Blood Glucose Monitoring Suppl (FREESTYLE FREEDOM LITE) w/Device KIT Test blood sugar once daily 1 each 0    escitalopram (LEXAPRO) 5 mg tablet Take 10 mg by mouth daily       famotidine (PEPCID) 40 MG tablet Take 40 mg by mouth every 12 (twelve) hours      fluticasone (FLONASE) 50 mcg/act nasal spray 1 spray into each nostril daily      glucose blood (FREESTYLE LITE) test strip Test blood sugar once daily 100 each 5    hydrochlorothiazide (HYDRODIURIL) 25 mg tablet Take 25 mg by mouth daily      Lancets (FREESTYLE) lancets Test blood sugar once daily 100 each 5    losartan (COZAAR) 100 MG tablet Take 100 mg by mouth daily      metFORMIN (GLUCOPHAGE) 1000 MG tablet Take 1,000 mg by mouth 2 (two) times a day with meals  1    simvastatin (ZOCOR) 40 mg tablet Take 40 mg by mouth daily  2    Testosterone 30 MG/ACT SOLN APPLY 3 PUMPS TOTAL PER  mL 1    tadalafil (CIALIS) 20 MG tablet Take 1 tablet (20 mg total) by mouth daily as needed for erectile dysfunction (Patient not taking: Reported on 9/21/2020) 10 tablet 0    valsartan (DIOVAN) 320 MG tablet Take 320 mg by mouth daily      valsartan-hydrochlorothiazide (DIOVAN-HCT) 320-25 MG per tablet Take 1 tablet by mouth daily  3     No current facility-administered medications for this visit  Allergies   Allergen Reactions    Contrast [Iodinated Diagnostic Agents] Shortness Of Breath and Itching       Objective   Vitals: Blood pressure 114/80, pulse (!) 112, height 6' 2" (1 88 m), weight 123 kg (270 lb 9 6 oz)  Physical Exam  Vitals signs and nursing note reviewed  Constitutional:       General: He is not in acute distress  Appearance: Normal appearance  He is not diaphoretic  HENT:      Head: Normocephalic and atraumatic  Eyes:      General: No scleral icterus  Extraocular Movements: Extraocular movements intact  Conjunctiva/sclera: Conjunctivae normal       Pupils: Pupils are equal, round, and reactive to light  Neck:      Musculoskeletal: Normal range of motion  Cardiovascular:      Rate and Rhythm: Normal rate and regular rhythm  Pulses: no weak pulses          Dorsalis pedis pulses are 2+ on the right side and 2+ on the left side  Posterior tibial pulses are 2+ on the right side and 2+ on the left side  Heart sounds: No murmur  Pulmonary:      Effort: Pulmonary effort is normal  No respiratory distress  Breath sounds: Normal breath sounds  No wheezing  Musculoskeletal:      Right lower leg: No edema  Left lower leg: No edema     Feet: Right foot:      Skin integrity: Dry skin present  No ulcer, skin breakdown, erythema, warmth or callus  Left foot:      Skin integrity: Dry skin present  No ulcer, skin breakdown, erythema, warmth or callus  Lymphadenopathy:      Cervical: No cervical adenopathy  Skin:     General: Skin is warm and dry  Neurological:      Mental Status: He is alert and oriented to person, place, and time  Mental status is at baseline  Sensory: No sensory deficit  Psychiatric:         Mood and Affect: Mood normal          Behavior: Behavior normal          Thought Content: Thought content normal      Patient's shoes and socks removed  Right Foot/Ankle   Right Foot Inspection  Skin Exam: skin normal, skin intact, dry skin and abnormal color no warmth, no callus, no erythema, no maceration, no pre-ulcer, no ulcer and no callus                          Toe Exam: ROM and strength within normal limitsno tenderness, erythema and  no right toe deformity  Sensory   Vibration: diminished  Proprioception: intact   Monofilament testing: diminished  Vascular  Capillary refills: < 3 seconds  The right DP pulse is 2+  The right PT pulse is 2+  Left Foot/Ankle  Left Foot Inspection  Skin Exam: skin normal, skin intact, dry skin and abnormal colorno warmth, no erythema, no maceration, no pre-ulcer, no ulcer and no callus                         Toe Exam: ROM and strength within normal limitsno tenderness, no erythema and no left toe deformity                   Sensory   Vibration: diminished  Proprioception: intact  Monofilament: diminished  Vascular  Capillary refills: < 3 seconds  The left DP pulse is 2+  The left PT pulse is 2+  Assign Risk Category:  No deformity present; No loss of protective sensation; No weak pulses       Risk: 1        The history was obtained from the review of the chart, patient      Lab Results:   Lab Results   Component Value Date/Time    Hemoglobin A1C 6 5 (H) 03/02/2021 11:05 AM    Hemoglobin A1C 5 9 (H) 09/14/2020 11:41 AM    White Blood Cell Count 5 6 03/02/2021 11:05 AM    Hemoglobin 12 6 (L) 03/02/2021 11:05 AM    Hemoglobin 12 3 (L) 09/14/2020 11:41 AM    HCT 34 1 (L) 03/02/2021 11:05 AM    HCT 34 0 (L) 09/14/2020 11:41 AM    MCV 97 03/02/2021 11:05 AM    Platelet Count 097 73/09/8728 11:05 AM    BUN 23 03/02/2021 11:05 AM    BUN 22 09/14/2020 11:41 AM    Potassium 4 5 03/02/2021 11:05 AM    Potassium 4 0 09/14/2020 11:41 AM    Chloride 98 03/02/2021 11:05 AM    Chloride 94 (L) 09/14/2020 11:41 AM    CO2 30 (H) 03/02/2021 11:05 AM    CO2 28 09/14/2020 11:41 AM    Creatinine 1 41 (H) 03/02/2021 11:05 AM    Creatinine 1 26 09/14/2020 11:41 AM    AST 65 (H) 03/02/2021 11:05 AM    AST 34 09/14/2020 11:41 AM    ALT 69 (H) 03/02/2021 11:05 AM    ALT 35 09/14/2020 11:41 AM    Albumin 4 3 03/02/2021 11:05 AM    Albumin 4 5 09/14/2020 11:41 AM    Globulin, Total 2 6 03/02/2021 11:05 AM    Globulin, Total 2 2 09/14/2020 11:41 AM       Portions of the record may have been created with voice recognition software  Occasional wrong word or "sound a like" substitutions may have occurred due to the inherent limitations of voice recognition software  Read the chart carefully and recognize, using context, where substitutions have occurred

## 2021-03-10 NOTE — TELEPHONE ENCOUNTER
Upon review of the In Basket request we were able to locate, review, and update the patient chart as requested for CRC: Colonoscopy  Any additional questions or concerns should be emailed to the Practice Liaisons via [email or web address] email, please do not reply via In Basket      Thank you  Petrona Lock

## 2021-04-12 ENCOUNTER — DOCUMENTATION (OUTPATIENT)
Dept: ENDOCRINOLOGY | Facility: HOSPITAL | Age: 71
End: 2021-04-12

## 2021-06-03 LAB
LEFT EYE DIABETIC RETINOPATHY: NORMAL
RIGHT EYE DIABETIC RETINOPATHY: NORMAL
SEVERITY (EYE EXAM): NORMAL

## 2021-07-17 LAB
ALBUMIN SERPL-MCNC: 4.4 G/DL (ref 3.8–4.8)
ALBUMIN/GLOB SERPL: 1.8 {RATIO} (ref 1.2–2.2)
ALP SERPL-CCNC: 57 IU/L (ref 48–121)
ALT SERPL-CCNC: 54 IU/L (ref 0–44)
AST SERPL-CCNC: 74 IU/L (ref 0–40)
BASOPHILS # BLD AUTO: 0.1 X10E3/UL (ref 0–0.2)
BASOPHILS NFR BLD AUTO: 1 %
BILIRUB SERPL-MCNC: 1.2 MG/DL (ref 0–1.2)
BUN SERPL-MCNC: 28 MG/DL (ref 8–27)
BUN/CREAT SERPL: 12 (ref 10–24)
CALCIUM SERPL-MCNC: 9.8 MG/DL (ref 8.6–10.2)
CHLORIDE SERPL-SCNC: 93 MMOL/L (ref 96–106)
CHOLEST SERPL-MCNC: 155 MG/DL (ref 100–199)
CO2 SERPL-SCNC: 23 MMOL/L (ref 20–29)
CREAT SERPL-MCNC: 2.32 MG/DL (ref 0.76–1.27)
EOSINOPHIL # BLD AUTO: 0.1 X10E3/UL (ref 0–0.4)
EOSINOPHIL NFR BLD AUTO: 1 %
ERYTHROCYTE [DISTWIDTH] IN BLOOD BY AUTOMATED COUNT: 13.8 % (ref 11.6–15.4)
GLOBULIN SER-MCNC: 2.4 G/DL (ref 1.5–4.5)
GLUCOSE SERPL-MCNC: 108 MG/DL (ref 65–99)
HBA1C MFR BLD: 6.2 % (ref 4.8–5.6)
HCT VFR BLD AUTO: 36.6 % (ref 37.5–51)
HDLC SERPL-MCNC: 101 MG/DL
HGB BLD-MCNC: 12.5 G/DL (ref 13–17.7)
IMM GRANULOCYTES # BLD: 0 X10E3/UL (ref 0–0.1)
IMM GRANULOCYTES NFR BLD: 0 %
LDLC SERPL CALC-MCNC: 39 MG/DL (ref 0–99)
LYMPHOCYTES # BLD AUTO: 1.4 X10E3/UL (ref 0.7–3.1)
LYMPHOCYTES NFR BLD AUTO: 19 %
MCH RBC QN AUTO: 34 PG (ref 26.6–33)
MCHC RBC AUTO-ENTMCNC: 34.2 G/DL (ref 31.5–35.7)
MCV RBC AUTO: 100 FL (ref 79–97)
MONOCYTES # BLD AUTO: 1 X10E3/UL (ref 0.1–0.9)
MONOCYTES NFR BLD AUTO: 14 %
NEUTROPHILS # BLD AUTO: 4.5 X10E3/UL (ref 1.4–7)
NEUTROPHILS NFR BLD AUTO: 65 %
PLATELET # BLD AUTO: 128 X10E3/UL (ref 150–450)
POTASSIUM SERPL-SCNC: 4.7 MMOL/L (ref 3.5–5.2)
PROLACTIN SERPL-MCNC: 14 NG/ML (ref 4–15.2)
PROT SERPL-MCNC: 6.8 G/DL (ref 6–8.5)
RBC # BLD AUTO: 3.68 X10E6/UL (ref 4.14–5.8)
SL AMB EGFR AFRICAN AMERICAN: 32 ML/MIN/1.73
SL AMB EGFR NON AFRICAN AMERICAN: 28 ML/MIN/1.73
SL AMB VLDL CHOLESTEROL CALC: 15 MG/DL (ref 5–40)
SODIUM SERPL-SCNC: 139 MMOL/L (ref 134–144)
TESTOST SERPL-MCNC: 264.7 NG/DL (ref 264–916)
TRIGL SERPL-MCNC: 78 MG/DL (ref 0–149)
TSH SERPL DL<=0.005 MIU/L-ACNC: 1.84 UIU/ML (ref 0.45–4.5)
WBC # BLD AUTO: 7 X10E3/UL (ref 3.4–10.8)

## 2021-07-19 ENCOUNTER — OFFICE VISIT (OUTPATIENT)
Dept: ENDOCRINOLOGY | Facility: HOSPITAL | Age: 71
End: 2021-07-19
Payer: MEDICARE

## 2021-07-19 VITALS
BODY MASS INDEX: 33.88 KG/M2 | SYSTOLIC BLOOD PRESSURE: 112 MMHG | DIASTOLIC BLOOD PRESSURE: 78 MMHG | HEIGHT: 74 IN | WEIGHT: 264 LBS | HEART RATE: 131 BPM

## 2021-07-19 DIAGNOSIS — Z86.018 HISTORY OF PROLACTINOMA: ICD-10-CM

## 2021-07-19 DIAGNOSIS — E11.9 TYPE 2 DIABETES MELLITUS WITHOUT COMPLICATION, WITHOUT LONG-TERM CURRENT USE OF INSULIN (HCC): Primary | ICD-10-CM

## 2021-07-19 DIAGNOSIS — E78.5 HYPERLIPIDEMIA, UNSPECIFIED HYPERLIPIDEMIA TYPE: ICD-10-CM

## 2021-07-19 DIAGNOSIS — E83.52 HYPERCALCEMIA: ICD-10-CM

## 2021-07-19 DIAGNOSIS — I10 ESSENTIAL HYPERTENSION: ICD-10-CM

## 2021-07-19 DIAGNOSIS — E29.1 HYPOGONADISM IN MALE: ICD-10-CM

## 2021-07-19 PROCEDURE — 99215 OFFICE O/P EST HI 40 MIN: CPT | Performed by: INTERNAL MEDICINE

## 2021-07-19 RX ORDER — TESTOSTERONE 30 MG/1.5ML
SOLUTION TOPICAL
Qty: 270 ML | Refills: 1 | Status: SHIPPED | OUTPATIENT
Start: 2021-07-19

## 2021-07-19 NOTE — PROGRESS NOTES
7/19/2021    Assessment/Plan      Diagnoses and all orders for this visit:    Type 2 diabetes mellitus without complication, without long-term current use of insulin ([facility] [address] )  -     Comprehensive metabolic panel Lab Collect; Future    Hyperlipidemia, unspecified hyperlipidemia type    Hypercalcemia    History of prolactinoma    Essential hypertension    Hypogonadism in male  -     Testosterone 30 MG/ACT SOLN; 3 pumps total daily        Assessment/Plan:  1  Type 2 diabetes:  Overall well controlled on current regimen  Follow-up in 6 months  2  History of hypercalcemia: This corrects to mid normal range recently  Monitor over time  No further workup at this time  3   Hypertension: Normotensive the office today on current regimen  4  Hyperlipidemia: Continue simvastatin  5  Hypogonadism: Continue current testosterone supplementation  6  History of prolactinoma: Prolactin levels are normal off of medication  Monitor over time  7  Elevated creatinine and decrease in GFR:  Encouraged hydration  Repeat CMP in 1-2 weeks  We will call with the results  Discussed that if his levels stay stable we may need to consider adjusting dosages of his medication and consider nephrology referral       CC: Diabetes follow-up    History of Present Illness     HPI: Garret Torres is a 72y o  year old male with type 2 diabetes for about 5-6 years  He is on oral agents at home and takes metformin 1000 mg bid  He denies any polyuria, polydipsia, nocturia and blurry vision  He denies neuropathy, nephropathy and retinopathy  Jardiance in the past cause lightheadedness and dizziness and was discontinued at that point  Hypoglycemic episodes: No      The patient's last eye exam was in July 2021  The patient's last foot exam was in March 2021  Blood Sugar/Glucometer/Pump/CGM review: No logs to review        He has history of hypogonadism treated with testosterone 30 mg per actuation and takes 3 pumps daily  He also has hyperlipidemia treated with simvastatin 40 mg daily  For hypertension he is maintained on losartan 100 mg daily as well as hydrochlorothiazide 25 mg daily  He also has history of prolactinoma treated with dopamine agonist therapy for many years  This was stopped after prolactin levels were controlled and MRI from 2017 showed no evidence of pituitary lesion  Testosterone did not improve after correction of prolactin so he continues on testosterone supplementation  Review of Systems   Constitutional: Negative for fatigue  HENT: Negative for trouble swallowing and voice change  Eyes: Negative for visual disturbance  Respiratory: Negative for shortness of breath  Cardiovascular: Negative for palpitations and leg swelling  Gastrointestinal: Negative for abdominal pain, nausea and vomiting  Endocrine: Negative for polydipsia and polyuria  Musculoskeletal: Negative for arthralgias and myalgias  Skin: Negative for rash  Neurological: Negative for dizziness, tremors and weakness  Hematological: Negative for adenopathy  Psychiatric/Behavioral: Negative for agitation and confusion  Historical Information   History reviewed  No pertinent past medical history  History reviewed  No pertinent surgical history    Social History   Social History     Substance and Sexual Activity   Alcohol Use None     Social History     Substance and Sexual Activity   Drug Use Not on file     Social History     Tobacco Use   Smoking Status Former Smoker    Packs/day: 0 50    Types: Cigarettes    Quit date:     Years since quittin 5   Smokeless Tobacco Never Used     Family History:   Family History   Problem Relation Age of Onset   Cecilia Bello Breast cancer Mother     Alzheimer's disease Mother     No Known Problems Father     Diabetes unspecified Brother        Meds/Allergies   Current Outpatient Medications   Medication Sig Dispense Refill    aspirin (ECOTRIN LOW STRENGTH) 81 mg EC tablet Take 81 mg by mouth daily      Blood Glucose Monitoring Suppl (FREESTYLE FREEDOM LITE) w/Device KIT Test blood sugar once daily 1 each 0    escitalopram (LEXAPRO) 10 mg tablet Take 10 mg by mouth daily       famotidine (PEPCID) 40 MG tablet Take 40 mg by mouth every 12 (twelve) hours      fluticasone (FLONASE) 50 mcg/act nasal spray 1 spray into each nostril daily      glucose blood (FREESTYLE LITE) test strip Test blood sugar once daily 100 each 5    hydrochlorothiazide (HYDRODIURIL) 25 mg tablet Take 25 mg by mouth daily      Lancets (FREESTYLE) lancets Test blood sugar once daily 100 each 5    losartan (COZAAR) 100 MG tablet Take 100 mg by mouth daily      metFORMIN (GLUCOPHAGE) 1000 MG tablet Take 1,000 mg by mouth 2 (two) times a day with meals  1    simvastatin (ZOCOR) 40 mg tablet Take 40 mg by mouth daily  2    Testosterone 30 MG/ACT SOLN 3 pumps total daily 270 mL 1    tadalafil (CIALIS) 20 MG tablet Take 1 tablet (20 mg total) by mouth daily as needed for erectile dysfunction (Patient not taking: Reported on 9/21/2020) 10 tablet 0     No current facility-administered medications for this visit  Allergies   Allergen Reactions    Contrast [Iodinated Diagnostic Agents] Shortness Of Breath and Itching       Objective   Vitals: Blood pressure 112/78, pulse (!) 131, height 6' 2" (1 88 m), weight 120 kg (264 lb)  Invasive Devices     None                 Physical Exam  Vitals reviewed  Constitutional:       General: He is not in acute distress  Appearance: He is well-developed  He is not diaphoretic  HENT:      Head: Normocephalic and atraumatic  Eyes:      Conjunctiva/sclera: Conjunctivae normal       Pupils: Pupils are equal, round, and reactive to light  Neck:      Thyroid: No thyromegaly  Cardiovascular:      Rate and Rhythm: Normal rate and regular rhythm  Pulmonary:      Effort: Pulmonary effort is normal  No respiratory distress        Breath sounds: Normal breath sounds  Abdominal:      General: Bowel sounds are normal       Palpations: Abdomen is soft  Musculoskeletal:         General: Normal range of motion  Cervical back: Normal range of motion and neck supple  Skin:     General: Skin is warm and dry  Findings: No rash  Neurological:      Mental Status: He is alert and oriented to person, place, and time  Motor: No abnormal muscle tone  Psychiatric:         Behavior: Behavior normal          The history was obtained from the review of the chart and from the patient      Lab Results:    Most recent Alc is  Lab Results   Component Value Date    HGBA1C 6 2 (H) 07/15/2021           No components found for: HA1C  No components found for: GLU    Lab Results   Component Value Date    CREATININE 2 32 (H) 07/15/2021    CREATININE 1 41 (H) 03/02/2021    CREATININE 1 26 09/14/2020    BUN 28 (H) 07/15/2021    K 4 7 07/15/2021    CL 93 (L) 07/15/2021    CO2 23 07/15/2021     No results found for: EGFR  No components found for: [facility]    Lab Results   Component Value Date     07/15/2021    TRIG 78 07/15/2021       Lab Results   Component Value Date    ALT 54 (H) 07/15/2021    AST 74 (H) 07/15/2021       Lab Results   Component Value Date    TSH 1 840 07/15/2021    FREET4 1 24 03/20/2019       Recent Results (from the past 39303 hour(s))   Comprehensive metabolic panel    Collection Time: 09/14/20 11:41 AM   Result Value Ref Range    Glucose, Random 155 (H) 65 - 99 mg/dL    BUN 22 8 - 27 mg/dL    Creatinine 1 26 0 76 - 1 27 mg/dL    eGFR Non African American 58 (L) >59 mL/min/1 73    eGFR  67 >59 mL/min/1 73    SL AMB BUN/CREATININE RATIO 17 10 - 24    Sodium 139 134 - 144 mmol/L    Potassium 4 0 3 5 - 5 2 mmol/L    Chloride 94 (L) 96 - 106 mmol/L    CO2 28 20 - 29 mmol/L    CALCIUM 10 7 (H) 8 6 - 10 2 mg/dL    Protein, Total 6 7 6 0 - 8 5 g/dL    Albumin 4 5 3 8 - 4 8 g/dL    Globulin, Total 2 2 1 5 - 4 5 g/dL Albumin/Globulin Ratio 2 0 1 2 - 2 2    TOTAL BILIRUBIN 1 0 0 0 - 1 2 mg/dL    Alk Phos Isoenzymes 50 39 - 117 IU/L    AST 34 0 - 40 IU/L    ALT 35 0 - 44 IU/L   Hemoglobin A1C    Collection Time: 09/14/20 11:41 AM   Result Value Ref Range    Hemoglobin A1C 5 9 (H) 4 8 - 5 6 %    Estimated Average Glucose 123 mg/dL   Testosterone, free, total    Collection Time: 09/14/20 11:41 AM   Result Value Ref Range    TESTOSTERONE TOTAL 283 264 - 916 ng/dL    Testosterone, Free 12 0 6 6 - 18 1 pg/mL   Prolactin    Collection Time: 09/14/20 11:41 AM   Result Value Ref Range    Prolactin 9 2 4 0 - 15 2 ng/mL   Vitamin D 25 hydroxy    Collection Time: 09/14/20 11:41 AM   Result Value Ref Range    25-HYDROXY VIT D 40 2 30 0 - 100 0 ng/mL   TSH, 3rd generation with Free T4 reflex    Collection Time: 09/14/20 11:41 AM   Result Value Ref Range    TSH 2 060 0 450 - 4 500 uIU/mL   Phosphorus    Collection Time: 09/14/20 11:41 AM   Result Value Ref Range    Phosphorus, Serum 2 4 (L) 2 8 - 4 1 mg/dL   Hemoglobin    Collection Time: 09/14/20 11:41 AM   Result Value Ref Range    Hemoglobin 12 3 (L) 13 0 - 17 7 g/dL   Hematocrit    Collection Time: 09/14/20 11:41 AM   Result Value Ref Range    HCT 34 0 (L) 37 5 - 51 0 %   PTH, intact    Collection Time: 09/14/20 11:41 AM   Result Value Ref Range    PTH, Intact 11 (L) 15 - 65 pg/mL   Denisa Hernandez Default    Collection Time: 03/02/21 11:05 AM   Result Value Ref Range    White Blood Cell Count 5 6 3 4 - 10 8 x10E3/uL    Red Blood Cell Count 3 51 (L) 4 14 - 5 80 x10E6/uL    Hemoglobin 12 6 (L) 13 0 - 17 7 g/dL    HCT 34 1 (L) 37 5 - 51 0 %    MCV 97 79 - 97 fL    MCH 35 9 (H) 26 6 - 33 0 pg    MCHC 37 0 (H) 31 5 - 35 7 g/dL    RDW 12 7 11 6 - 15 4 %    Platelet Count 052 107 - 450 x10E3/uL    Neutrophils 60 Not Estab  %    Lymphocytes 28 Not Estab  %    Monocytes 8 Not Estab  %    Eosinophils 2 Not Estab  %    Basophils PCT 1 Not Estab  %    Neutrophils (Absolute) 3 3 1 4 - 7 0 x10E3/uL Lymphocytes (Absolute) 1 6 0 7 - 3 1 x10E3/uL    Monocytes (Absolute) 0 5 0 1 - 0 9 x10E3/uL    Eosinophils (Absolute) 0 1 0 0 - 0 4 x10E3/uL    Basophils ABS 0 0 0 0 - 0 2 x10E3/uL    Immature Granulocytes 1 Not Estab  %    Immature Granulocytes (Absolute) 0 1 0 0 - 0 1 x10E3/uL    Hematology Comments Note:    Comprehensive metabolic panel    Collection Time: 03/02/21 11:05 AM   Result Value Ref Range    Glucose, Random 171 (H) 65 - 99 mg/dL    BUN 23 8 - 27 mg/dL    Creatinine 1 41 (H) 0 76 - 1 27 mg/dL    eGFR Non African American 50 (L) >59 mL/min/1 73    eGFR  58 (L) >59 mL/min/1 73    SL AMB BUN/CREATININE RATIO 16 10 - 24    Sodium 141 134 - 144 mmol/L    Potassium 4 5 3 5 - 5 2 mmol/L    Chloride 98 96 - 106 mmol/L    CO2 30 (H) 20 - 29 mmol/L    CALCIUM 10 0 8 6 - 10 2 mg/dL    Protein, Total 6 9 6 0 - 8 5 g/dL    Albumin 4 3 3 8 - 4 8 g/dL    Globulin, Total 2 6 1 5 - 4 5 g/dL    Albumin/Globulin Ratio 1 7 1 2 - 2 2    TOTAL BILIRUBIN 0 6 0 0 - 1 2 mg/dL    Alk Phos Isoenzymes 68 39 - 117 IU/L    AST 65 (H) 0 - 40 IU/L    ALT 69 (H) 0 - 44 IU/L   Testosterone, free, total    Collection Time: 03/02/21 11:05 AM   Result Value Ref Range    TESTOSTERONE TOTAL 195 (L) 264 - 916 ng/dL    Testosterone, Free 8 9 6 6 - 18 1 pg/mL   Hemoglobin A1c (w/out EAG)    Collection Time: 03/02/21 11:05 AM   Result Value Ref Range    Hemoglobin A1C 6 5 (H) 4 8 - 5 6 %   Prolactin    Collection Time: 03/02/21 11:05 AM   Result Value Ref Range    Prolactin 9 8 4 0 - 15 2 ng/mL   Vitamin D 1,25 dihydroxy    Collection Time: 03/02/21 11:05 AM   Result Value Ref Range    Vit D, 1,25-Dihydroxy 24 8 19 9 - 79 3 pg/mL   Vitamin D 25 hydroxy    Collection Time: 03/02/21 11:05 AM   Result Value Ref Range    25-HYDROXY VIT D 37 2 30 0 - 100 0 ng/mL   Phosphorus    Collection Time: 03/02/21 11:05 AM   Result Value Ref Range    Phosphorus, Serum 2 5 (L) 2 8 - 4 1 mg/dL   PTH, intact    Collection Time: 03/02/21 11:05 AM Result Value Ref Range    PTH, Intact 17 15 - 65 pg/mL   Ambig Abbrev Default    Collection Time: 07/15/21 10:57 AM   Result Value Ref Range    White Blood Cell Count 7 0 3 4 - 10 8 x10E3/uL    Red Blood Cell Count 3 68 (L) 4 14 - 5 80 x10E6/uL    Hemoglobin 12 5 (L) 13 0 - 17 7 g/dL    HCT 36 6 (L) 37 5 - 51 0 %     (H) 79 - 97 fL    MCH 34 0 (H) 26 6 - 33 0 pg    MCHC 34 2 31 5 - 35 7 g/dL    RDW 13 8 11 6 - 15 4 %    Platelet Count 934 (L) 150 - 450 x10E3/uL    Neutrophils 65 Not Estab  %    Lymphocytes 19 Not Estab  %    Monocytes 14 Not Estab  %    Eosinophils 1 Not Estab  %    Basophils PCT 1 Not Estab  %    Neutrophils (Absolute) 4 5 1 4 - 7 0 x10E3/uL    Lymphocytes (Absolute) 1 4 0 7 - 3 1 x10E3/uL    Monocytes (Absolute) 1 0 (H) 0 1 - 0 9 x10E3/uL    Eosinophils (Absolute) 0 1 0 0 - 0 4 x10E3/uL    Basophils ABS 0 1 0 0 - 0 2 x10E3/uL    Immature Granulocytes 0 Not Estab  %    Immature Granulocytes (Absolute) 0 0 0 0 - 0 1 x10E3/uL   Comprehensive metabolic panel    Collection Time: 07/15/21 10:57 AM   Result Value Ref Range    Glucose, Random 108 (H) 65 - 99 mg/dL    BUN 28 (H) 8 - 27 mg/dL    Creatinine 2 32 (H) 0 76 - 1 27 mg/dL    eGFR Non  28 (L) >59 mL/min/1 73    eGFR  32 (L) >59 mL/min/1 73    SL AMB BUN/CREATININE RATIO 12 10 - 24    Sodium 139 134 - 144 mmol/L    Potassium 4 7 3 5 - 5 2 mmol/L    Chloride 93 (L) 96 - 106 mmol/L    CO2 23 20 - 29 mmol/L    CALCIUM 9 8 8 6 - 10 2 mg/dL    Protein, Total 6 8 6 0 - 8 5 g/dL    Albumin 4 4 3 8 - 4 8 g/dL    Globulin, Total 2 4 1 5 - 4 5 g/dL    Albumin/Globulin Ratio 1 8 1 2 - 2 2    TOTAL BILIRUBIN 1 2 0 0 - 1 2 mg/dL    Alk Phos Isoenzymes 57 48 - 121 IU/L    AST 74 (H) 0 - 40 IU/L    ALT 54 (H) 0 - 44 IU/L   Lipid panel    Collection Time: 07/15/21 10:57 AM   Result Value Ref Range    Cholesterol, Total 155 100 - 199 mg/dL    Triglycerides 78 0 - 149 mg/dL     >39 mg/dL    VLDL Cholesterol Calculated 15 5 - 40 mg/dL    LDL Calculated 39 0 - 99 mg/dL   Hemoglobin A1c (w/out EAG)    Collection Time: 07/15/21 10:57 AM   Result Value Ref Range    Hemoglobin A1C 6 2 (H) 4 8 - 5 6 %   Prolactin    Collection Time: 07/15/21 10:57 AM   Result Value Ref Range    Prolactin 14 0 4 0 - 15 2 ng/mL   Testosterone    Collection Time: 07/15/21 10:57 AM   Result Value Ref Range    Testosterone, Total, LC/ 7 264 0 - 916 0 ng/dL   TSH, 3rd generation with Free T4 reflex    Collection Time: 07/15/21 10:57 AM   Result Value Ref Range    TSH 1 840 0 450 - 4 500 uIU/mL         No future appointments  Portions of the record may have been created with voice recognition software  Occasional wrong word or "sound a like" substitutions may have occurred due to the inherent limitations of voice recognition software  Read the chart carefully and recognize, using context, where substitutions have occurred

## 2021-07-20 ENCOUNTER — TELEPHONE (OUTPATIENT)
Dept: ADMINISTRATIVE | Facility: OTHER | Age: 71
End: 2021-07-20

## 2021-07-20 NOTE — TELEPHONE ENCOUNTER
Upon review of the In Basket request and the patient's chart, initial outreach has been made via fax, please see Contacts section for details       Thank you  Manny Hood MA

## 2021-07-20 NOTE — LETTER
Diabetic Eye Exam Form    Date Requested: 21  Patient: Destiny Jimenez  Patient : 1950   Referring Provider: Jono Mendiola MD    Dilated Retinal Exam, Optomap-Iris Exam, or Fundus Photography Done         Yes ([place] one above)         No     Date of Diabetic Eye Exam ______________________________  Left Eye      Exam did show retinopathy    Exam did not show retinopathy         Mild       Moderate       None       Proliferative       Severe     Right Eye     Exam did show retinopathy    Exam did not show retinopathy         Mild       Moderate       None       Proliferative       Severe     Comments __________________________________________________________    Practice Providing Exam ______________________________________________    Exam Performed By (print name) _______________________________________      Provider Signature ___________________________________________________      These reports are needed for  compliance    Please fax this completed form and a copy of the Diabetic Eye Exam report to our office located at [place] [address] as soon as possible to [phone] attention Luz Elena: Phone [phone]    We thank you for your assistance in treating our mutual patient

## 2021-07-20 NOTE — TELEPHONE ENCOUNTER
----- Message from [facility] sent at 7/19/2021 12:43 PM EDT -----  Regarding: DM Eye Exam  07/19/21 12:44 PM    Hello, our patient Jael Desir has had a DM Eye Exam performed at the [facility] in [place]  Their number is [phone]      Thank you,  [facility] [place]

## 2021-07-20 NOTE — LETTER
Diabetic Eye Exam Form    Date Requested: 21  Patient: Gabrielle Luu  Patient : 1950   Referring Provider: Romero Myers MD    Dilated Retinal Exam, Optomap-Iris Exam, or Fundus Photography Done         Yes ([place] one above)         No     Date of Diabetic Eye Exam ______________________________  Left Eye      Exam did show retinopathy    Exam did not show retinopathy         Mild       Moderate       None       Proliferative       Severe     Right Eye     Exam did show retinopathy    Exam did not show retinopathy         Mild       Moderate       None       Proliferative       Severe     Comments __________________________________________________________    Practice Providing Exam ______________________________________________    Exam Performed By (print name) _______________________________________      Provider Signature ___________________________________________________      These reports are needed for  compliance    Please fax this completed form and a copy of the Diabetic Eye Exam report to our office located at [place] [address] as soon as possible to [phone] attention Luz Elena: Phone [phone]    We thank you for your assistance in treating our mutual patient

## 2021-07-27 NOTE — TELEPHONE ENCOUNTER
As a follow-up, a second attempt has been made for outreach via fax, please see Contacts section for details      Thank you  John Camacho MA

## 2021-07-30 NOTE — TELEPHONE ENCOUNTER
As a final attempt, a third outreach has been made via telephone call  Please see Contacts section for details  This encounter will be closed and completed by end of day  Should we receive the requested information because of previous outreach attempts, the requested patient's chart will be updated appropriately       Thank you  Elodia Rivero MA

## 2021-07-30 NOTE — TELEPHONE ENCOUNTER
I called the patient to see if he could get in touch with them and have them send it  I also called [facility] and spoke to someone who's name I could not understand and asked him to fax us the report

## 2021-08-17 ENCOUNTER — VBI (OUTPATIENT)
Dept: ADMINISTRATIVE | Facility: OTHER | Age: 71
End: 2021-08-17

## 2022-05-05 ENCOUNTER — TELEPHONE (OUTPATIENT)
Dept: ENDOCRINOLOGY | Facility: HOSPITAL | Age: 72
End: 2022-05-05

## 2022-05-05 NOTE — TELEPHONE ENCOUNTER
Patient scheduled for 5-20-22  Was last seen 7-19-21  What blood work do you want done prior to the visit? Fax lab slip to the [employer] in [facility]  Let patient know when it has been done

## 2022-09-13 ENCOUNTER — TELEPHONE (OUTPATIENT)
Dept: ENDOCRINOLOGY | Facility: CLINIC | Age: 72
End: 2022-09-13

## 2022-09-13 NOTE — TELEPHONE ENCOUNTER
Pt left a message requesting a call back to schedule an f/u appointment so he can get his medication refill

## 2022-09-22 ENCOUNTER — TELEPHONE (OUTPATIENT)
Dept: ENDOCRINOLOGY | Facility: HOSPITAL | Age: 72
End: 2022-09-22

## 2022-09-22 DIAGNOSIS — E83.52 HYPERCALCEMIA: ICD-10-CM

## 2022-09-22 DIAGNOSIS — E29.1 HYPOGONADISM IN MALE: ICD-10-CM

## 2022-09-22 DIAGNOSIS — E11.9 TYPE 2 DIABETES MELLITUS WITHOUT COMPLICATION, WITHOUT LONG-TERM CURRENT USE OF INSULIN (HCC): Primary | ICD-10-CM

## 2022-09-22 DIAGNOSIS — Z86.018 HISTORY OF PROLACTINOMA: ICD-10-CM

## 2022-09-22 DIAGNOSIS — Z12.5 PROSTATE CANCER SCREENING: ICD-10-CM

## 2022-09-22 DIAGNOSIS — Z12.5 ENCOUNTER FOR SCREENING FOR MALIGNANT NEOPLASM OF PROSTATE: ICD-10-CM

## 2022-09-22 DIAGNOSIS — E78.5 HYPERLIPIDEMIA, UNSPECIFIED HYPERLIPIDEMIA TYPE: ICD-10-CM

## 2022-09-22 RX ORDER — TESTOSTERONE 30 MG/1.5ML
SOLUTION TOPICAL
Qty: 90 ACT | Refills: 0 | Status: SHIPPED | OUTPATIENT
Start: 2022-09-22

## 2022-09-22 NOTE — TELEPHONE ENCOUNTER
This pt is seeing you in October  He was seeing you here but his last visit was July 2021 with 5 no show/cancellations  We did send him a letter but he called  to make an appt and the letter crossed in the mail  He was told that if does not keep the appt with you, then he is discharged from the department  He asked me if you would refill his testosterone to [facility]  I told him that I would sen you a message but I could not guarantee that you would  Jose F Stephon put orders in for labs in May and they were not done

## 2022-09-29 ENCOUNTER — TELEPHONE (OUTPATIENT)
Dept: ENDOCRINOLOGY | Facility: CLINIC | Age: 72
End: 2022-09-29

## 2022-09-29 NOTE — TELEPHONE ENCOUNTER
SADAF CROUCH Key: [id number] - PA Case ID: [id number]  Need help? Call us at [phone]    Status   Sent to [facility]    Drug    Testosterone 30MG/ACT solution   Form    [employer] Electronic PA Form (1528 NCPDP)

## 2022-10-13 LAB — HBA1C MFR BLD HPLC: 5.8 %

## 2022-10-15 LAB
25(OH)D3+25(OH)D2 SERPL-MCNC: 31.7 NG/ML (ref 30–100)
ALBUMIN SERPL-MCNC: 4.4 G/DL (ref 3.8–4.8)
ALBUMIN/CREAT UR: <2 MG/G CREAT (ref 0–29)
ALBUMIN/GLOB SERPL: 1.8 {RATIO} (ref 1.2–2.2)
ALP SERPL-CCNC: 85 IU/L (ref 44–121)
ALT SERPL-CCNC: 15 IU/L (ref 0–44)
AST SERPL-CCNC: 17 IU/L (ref 0–40)
BASOPHILS # BLD AUTO: 0 X10E3/UL (ref 0–0.2)
BASOPHILS NFR BLD AUTO: 1 %
BILIRUB SERPL-MCNC: 0.6 MG/DL (ref 0–1.2)
BUN SERPL-MCNC: 24 MG/DL (ref 8–27)
BUN/CREAT SERPL: 16 (ref 10–24)
CALCIUM SERPL-MCNC: 9.9 MG/DL (ref 8.6–10.2)
CHLORIDE SERPL-SCNC: 107 MMOL/L (ref 96–106)
CHOLEST SERPL-MCNC: 130 MG/DL (ref 100–199)
CHOLEST/HDLC SERPL: 3.3 RATIO (ref 0–5)
CO2 SERPL-SCNC: 23 MMOL/L (ref 20–29)
CREAT SERPL-MCNC: 1.48 MG/DL (ref 0.76–1.27)
CREAT UR-MCNC: 128.5 MG/DL
EGFR: 51 ML/MIN/1.73
EOSINOPHIL # BLD AUTO: 0.1 X10E3/UL (ref 0–0.4)
EOSINOPHIL NFR BLD AUTO: 2 %
ERYTHROCYTE [DISTWIDTH] IN BLOOD BY AUTOMATED COUNT: 13.5 % (ref 11.6–15.4)
GLOBULIN SER-MCNC: 2.4 G/DL (ref 1.5–4.5)
GLUCOSE SERPL-MCNC: 107 MG/DL (ref 70–99)
HBA1C MFR BLD: 5.8 % (ref 4.8–5.6)
HCT VFR BLD AUTO: 37.1 % (ref 37.5–51)
HDLC SERPL-MCNC: 40 MG/DL
HGB BLD-MCNC: 12.5 G/DL (ref 13–17.7)
IGF-I SERPL-MCNC: 148 NG/ML (ref 59–230)
IMM GRANULOCYTES # BLD: 0 X10E3/UL (ref 0–0.1)
IMM GRANULOCYTES NFR BLD: 0 %
LDLC SERPL DIRECT ASSAY-MCNC: 67 MG/DL (ref 0–99)
LYMPHOCYTES # BLD AUTO: 2 X10E3/UL (ref 0.7–3.1)
LYMPHOCYTES NFR BLD AUTO: 32 %
MCH RBC QN AUTO: 30.6 PG (ref 26.6–33)
MCHC RBC AUTO-ENTMCNC: 33.7 G/DL (ref 31.5–35.7)
MCV RBC AUTO: 91 FL (ref 79–97)
MICROALBUMIN UR-MCNC: <3 UG/ML
MONOCYTES # BLD AUTO: 0.6 X10E3/UL (ref 0.1–0.9)
MONOCYTES NFR BLD AUTO: 10 %
NEUTROPHILS # BLD AUTO: 3.5 X10E3/UL (ref 1.4–7)
NEUTROPHILS NFR BLD AUTO: 55 %
PLATELET # BLD AUTO: 143 X10E3/UL (ref 150–450)
POTASSIUM SERPL-SCNC: 4.1 MMOL/L (ref 3.5–5.2)
PROLACTIN SERPL-MCNC: 10.6 NG/ML (ref 4–15.2)
PROT SERPL-MCNC: 6.8 G/DL (ref 6–8.5)
PSA SERPL-MCNC: 0.2 NG/ML (ref 0–4)
PTH-INTACT SERPL-MCNC: 18 PG/ML (ref 15–65)
RBC # BLD AUTO: 4.09 X10E6/UL (ref 4.14–5.8)
SODIUM SERPL-SCNC: 143 MMOL/L (ref 134–144)
T4 FREE SERPL-MCNC: 1.16 NG/DL (ref 0.82–1.77)
TRIGL SERPL-MCNC: 106 MG/DL (ref 0–149)
TSH SERPL DL<=0.005 MIU/L-ACNC: 1.87 UIU/ML (ref 0.45–4.5)
WBC # BLD AUTO: 6.3 X10E3/UL (ref 3.4–10.8)

## 2022-10-21 ENCOUNTER — OFFICE VISIT (OUTPATIENT)
Dept: ENDOCRINOLOGY | Facility: CLINIC | Age: 72
End: 2022-10-21
Payer: MEDICARE

## 2022-10-21 VITALS
SYSTOLIC BLOOD PRESSURE: 110 MMHG | DIASTOLIC BLOOD PRESSURE: 70 MMHG | HEART RATE: 76 BPM | BODY MASS INDEX: 30.54 KG/M2 | WEIGHT: 245.6 LBS | HEIGHT: 75 IN

## 2022-10-21 DIAGNOSIS — E11.9 TYPE 2 DIABETES MELLITUS WITHOUT COMPLICATION, WITHOUT LONG-TERM CURRENT USE OF INSULIN (HCC): Primary | ICD-10-CM

## 2022-10-21 DIAGNOSIS — Z79.899 LONG-TERM USE OF HIGH-RISK MEDICATION: ICD-10-CM

## 2022-10-21 DIAGNOSIS — E78.5 HYPERLIPIDEMIA, UNSPECIFIED HYPERLIPIDEMIA TYPE: ICD-10-CM

## 2022-10-21 DIAGNOSIS — D51.3 OTHER DIETARY VITAMIN B12 DEFICIENCY ANEMIA: ICD-10-CM

## 2022-10-21 DIAGNOSIS — Z86.018 HISTORY OF PROLACTINOMA: ICD-10-CM

## 2022-10-21 DIAGNOSIS — E29.1 HYPOGONADISM IN MALE: ICD-10-CM

## 2022-10-21 DIAGNOSIS — I10 ESSENTIAL HYPERTENSION: ICD-10-CM

## 2022-10-21 DIAGNOSIS — D64.9 ANEMIA, UNSPECIFIED TYPE: ICD-10-CM

## 2022-10-21 PROCEDURE — 99214 OFFICE O/P EST MOD 30 MIN: CPT | Performed by: INTERNAL MEDICINE

## 2022-10-21 RX ORDER — FAMOTIDINE 40 MG/1
40 TABLET, FILM COATED ORAL 2 TIMES DAILY PRN
COMMUNITY

## 2022-10-21 RX ORDER — ESCITALOPRAM OXALATE 20 MG/1
20 TABLET ORAL DAILY
COMMUNITY
Start: 2022-10-20

## 2022-10-21 RX ORDER — MELATONIN
1000 DAILY
COMMUNITY

## 2022-10-21 NOTE — PROGRESS NOTES
10/21/2022    Assessment/Plan      Diagnoses and all orders for this visit:    Type 2 diabetes mellitus without complication, without long-term current use of insulin (HCC)    Hypogonadism in male  -     Testosterone, free, total Lab Collect; Future    Essential hypertension    History of prolactinoma    Hyperlipidemia, unspecified hyperlipidemia type    Anemia, unspecified type  -     Iron Panel (Includes Ferritin, Iron Sat%, Iron, and TIBC); Future  -     Vitamin B12; Future    Other dietary vitamin B12 deficiency anemia   -     Vitamin B12; Future    Long-term use of high-risk medication    Other orders  -     escitalopram (LEXAPRO) 20 mg tablet; Take 20 mg by mouth daily  -     famotidine (PEPCID) 40 MG tablet; Take 40 mg by mouth 2 (two) times a day as needed for heartburn or indigestion  -     cholecalciferol (VITAMIN D3) 1,000 units tablet; Take 1,000 Units by mouth daily        Assessment/Plan:  1  Type 2 diabetes:  Based on A1c overall controlled  Continue current regimen  Follow up in 6 months  2  Anemia: Check labs as ordered above  3  Hypogonadism:  The lab did not run a testosterone level  The patient has been back on his testosterone only a few weeks  Therefore will order a testosterone level to be done in 1-2 months and we will call with the results  4  Hypertension: Continue current regimen  5  Hyperlipidemia:  Continue simvastatin  CC: Diabetes follow-up    History of Present Illness     HPI: Soheila Marroquin is a 71y o  year old male with type 2 diabetes for about 6-7 years  He is on oral agents at home and takes metformin 1000 mg twice daily  He denies any polyuria, polydipsia, nocturia and blurry vision  He denies neuropathy, nephropathy and retinopathy  Hypoglycemic episodes: No        Blood Sugar/Glucometer/Pump/CGM review:  No logs review today  He has history of hyperlipidemia treated with simvastatin 40 mg daily        Hypertension he continues on losartan 100 mg daily and hydrochlorothiazide 25 mg daily  He also has a history of prolactinoma that was treated with dopamine agonist therapy for many years  This was stopped after prolactin levels were controlled an MRI in 2017 showed no evidence of pituitary lesion  Testosterone level did not improve after correction of prolactin so he continues on testosterone supplementation with 30 mg per actuation, 3 pumps daily  Review of Systems   Constitutional: Negative for fatigue  HENT: Negative for trouble swallowing and voice change  Eyes: Negative for visual disturbance  Respiratory: Negative for shortness of breath  Cardiovascular: Negative for palpitations and leg swelling  Gastrointestinal: Negative for abdominal pain, nausea and vomiting  Endocrine: Negative for polydipsia and polyuria  Musculoskeletal: Negative for arthralgias and myalgias  Skin: Negative for rash  Neurological: Negative for dizziness, tremors and weakness  Hematological: Negative for adenopathy  Psychiatric/Behavioral: Negative for agitation and confusion  Historical Information   History reviewed  No pertinent past medical history    Past Surgical History:   Procedure Laterality Date   • ORIF PROXIMAL TIBIAL PLATEAU FRACTURE Left      Social History   Social History     Substance and Sexual Activity   Alcohol Use Not Currently     Social History     Substance and Sexual Activity   Drug Use Not Currently   • Types: Marijuana     Social History     Tobacco Use   Smoking Status Former Smoker   • Packs/day: 0 50   • Years: 25 00   • Pack years: 12 50   • Types: Cigarettes   • Start date: 10/20/1975   • Quit date: 2001   • Years since quittin 8   Smokeless Tobacco Never Used     Family History:   Family History   Problem Relation Age of Onset   • Breast cancer Mother    • Alzheimer's disease Mother    • Hypertension Mother    • Diabetes type II Father    • Diabetes unspecified Brother Meds/Allergies   Current Outpatient Medications   Medication Sig Dispense Refill   • cholecalciferol (VITAMIN D3) 1,000 units tablet Take 1,000 Units by mouth daily     • escitalopram (LEXAPRO) 20 mg tablet Take 20 mg by mouth daily     • famotidine (PEPCID) 40 MG tablet Take 40 mg by mouth 2 (two) times a day as needed for heartburn or indigestion     • fluticasone (FLONASE) 50 mcg/act nasal spray 1 spray into each nostril daily     • hydrochlorothiazide (HYDRODIURIL) 25 mg tablet Take 25 mg by mouth daily     • losartan (COZAAR) 100 MG tablet Take 100 mg by mouth daily     • metFORMIN (GLUCOPHAGE) 1000 MG tablet Take 1,000 mg by mouth 2 (two) times a day with meals  1   • simvastatin (ZOCOR) 40 mg tablet Take 40 mg by mouth daily  2   • Testosterone 30 MG/ACT SOLN 3 pumps total daily 90 Act 0   • Blood Glucose Monitoring Suppl (FREESTYLE FREEDOM LITE) w/Device KIT Test blood sugar once daily (Patient not taking: Reported on 10/21/2022) 1 each 0   • glucose blood (FREESTYLE LITE) test strip Test blood sugar once daily (Patient not taking: Reported on 10/21/2022) 100 each 5   • Lancets (FREESTYLE) lancets Test blood sugar once daily (Patient not taking: Reported on 10/21/2022) 100 each 5   • tadalafil (CIALIS) 20 MG tablet Take 1 tablet (20 mg total) by mouth daily as needed for erectile dysfunction (Patient not taking: No sig reported) 10 tablet 0     No current facility-administered medications for this visit  Allergies   Allergen Reactions   • Contrast [Iodinated Diagnostic Agents] Shortness Of Breath and Itching       Objective   Vitals: Blood pressure 110/70, pulse 76, height 6' 2 5" (1 892 m), weight 111 kg (245 lb 9 6 oz)  Invasive Devices  Report    None                 Physical Exam  Vitals reviewed  Constitutional:       General: He is not in acute distress  Appearance: He is well-developed  He is not diaphoretic  HENT:      Head: Normocephalic and atraumatic     Eyes: Conjunctiva/sclera: Conjunctivae normal       Pupils: Pupils are equal, round, and reactive to light  Neck:      Thyroid: No thyromegaly  Cardiovascular:      Rate and Rhythm: Normal rate and regular rhythm  Pulses: no weak pulses          Dorsalis pedis pulses are 2+ on the right side and 2+ on the left side  Posterior tibial pulses are 2+ on the right side and 2+ on the left side  Pulmonary:      Effort: Pulmonary effort is normal  No respiratory distress  Breath sounds: Normal breath sounds  Abdominal:      General: Bowel sounds are normal       Palpations: Abdomen is soft  Musculoskeletal:         General: Normal range of motion  Cervical back: Normal range of motion and neck supple  Feet:      Right foot:      Skin integrity: Callus and dry skin present  No ulcer, skin breakdown, erythema or warmth  Left foot:      Skin integrity: Callus and dry skin present  No ulcer, skin breakdown, erythema or warmth  Skin:     General: Skin is warm and dry  Findings: No rash  Neurological:      Mental Status: He is alert and oriented to person, place, and time  Motor: No abnormal muscle tone  Psychiatric:         Behavior: Behavior normal       Patient's shoes and socks removed  Right Foot/Ankle   Right Foot Inspection  Skin Exam: skin normal, skin intact, dry skin, callus, abnormal color and callus  No warmth, no erythema, no maceration, no pre-ulcer and no ulcer  Sensory   Vibration: intact  Monofilament testing: diminished    Vascular  The right DP pulse is 2+  The right PT pulse is 2+  Left Foot/Ankle  Left Foot Inspection  Skin Exam: skin normal, skin intact, dry skin, abnormal color and callus  No warmth, no erythema, no maceration, no pre-ulcer and no ulcer  Sensory   Vibration: intact  Monofilament testing: diminished    Vascular  The left DP pulse is 2+  The left PT pulse is 2+       Assign Risk Category  No deformity present  No loss of protective sensation  No weak pulses  Risk: 0        The history was obtained from the review of the chart and from the patient  Lab Results:    Most recent Alc is  Lab Results   Component Value Date    HGBA1C 5 8 (H) 10/13/2022           No components found for: HA1C  No components found for: GLU    Lab Results   Component Value Date    CREATININE 1 48 (H) 10/13/2022    CREATININE 2 32 (H) 07/15/2021    CREATININE 1 41 (H) 03/02/2021    BUN 24 10/13/2022    K 4 1 10/13/2022     (H) 10/13/2022    CO2 23 10/13/2022     eGFR   Date Value Ref Range Status   10/13/2022 51 (L) >59 mL/min/1 73 Final     No components found for: [facility]    Lab Results   Component Value Date    HDL 40 10/13/2022    TRIG 106 10/13/2022    CHOLHDL 3 3 10/13/2022       Lab Results   Component Value Date    ALT 15 10/13/2022    AST 17 10/13/2022       Lab Results   Component Value Date    TSH 1 870 10/13/2022    FREET4 1 16 10/13/2022       Recent Results (from the past 74844 hour(s))   Microalbumin / creatinine urine ratio    Collection Time: 10/27/21 12:00 AM   Result Value Ref Range    EXT Creatinine Urine 181 1     Microalbum  ,U,Random 12 1     EXTERNAL Microalb/Creat Ratio 7    Hemoglobin A1C    Collection Time: 10/27/21 12:00 AM   Result Value Ref Range    Hemoglobin A1C 6 2    Hemoglobin A1C    Collection Time: 05/10/22 12:00 AM   Result Value Ref Range    Hemoglobin A1C 5 6    Microalbumin / creatinine urine ratio    Collection Time: 05/10/22 12:00 AM   Result Value Ref Range    EXT Creatinine Urine 173 3     Microalbum  ,U,Random 7 3     EXTERNAL Microalb/Creat Ratio 4    Hemoglobin A1C    Collection Time: 10/13/22 12:00 AM   Result Value Ref Range    Hemoglobin A1C 5 8    Lipid Panel with Direct LDL reflex    Collection Time: 10/13/22  9:34 AM   Result Value Ref Range    Cholesterol, Total 130 100 - 199 mg/dL    Triglycerides 106 0 - 149 mg/dL    HDL 40 >39 mg/dL    T   Chol/HDL Ratio 3 3 0 0 - 5 0 ratio    LDL Direct 67 0 - 99 mg/dL   Reggie June Default    Collection Time: 10/13/22  9:34 AM   Result Value Ref Range    White Blood Cell Count 6 3 3 4 - 10 8 x10E3/uL    Red Blood Cell Count 4 09 (L) 4 14 - 5 80 x10E6/uL    Hemoglobin 12 5 (L) 13 0 - 17 7 g/dL    HCT 37 1 (L) 37 5 - 51 0 %    MCV 91 79 - 97 fL    MCH 30 6 26 6 - 33 0 pg    MCHC 33 7 31 5 - 35 7 g/dL    RDW 13 5 11 6 - 15 4 %    Platelet Count 899 (L) 150 - 450 x10E3/uL    Neutrophils 55 Not Estab  %    Lymphocytes 32 Not Estab  %    Monocytes 10 Not Estab  %    Eosinophils 2 Not Estab  %    Basophils PCT 1 Not Estab  %    Neutrophils (Absolute) 3 5 1 4 - 7 0 x10E3/uL    Lymphocytes (Absolute) 2 0 0 7 - 3 1 x10E3/uL    Monocytes (Absolute) 0 6 0 1 - 0 9 x10E3/uL    Eosinophils (Absolute) 0 1 0 0 - 0 4 x10E3/uL    Basophils ABS 0 0 0 0 - 0 2 x10E3/uL    Immature Granulocytes 0 Not Estab  %    Immature Granulocytes (Absolute) 0 0 0 0 - 0 1 x10E3/uL   Comprehensive metabolic panel    Collection Time: 10/13/22  9:34 AM   Result Value Ref Range    Glucose, Random 107 (H) 70 - 99 mg/dL    BUN 24 8 - 27 mg/dL    Creatinine 1 48 (H) 0 76 - 1 27 mg/dL    eGFR 51 (L) >59 mL/min/1 73    SL AMB BUN/CREATININE RATIO 16 10 - 24    Sodium 143 134 - 144 mmol/L    Potassium 4 1 3 5 - 5 2 mmol/L    Chloride 107 (H) 96 - 106 mmol/L    CO2 23 20 - 29 mmol/L    CALCIUM 9 9 8 6 - 10 2 mg/dL    Protein, Total 6 8 6 0 - 8 5 g/dL    Albumin 4 4 3 8 - 4 8 g/dL    Globulin, Total 2 4 1 5 - 4 5 g/dL    Albumin/Globulin Ratio 1 8 1 2 - 2 2    TOTAL BILIRUBIN 0 6 0 0 - 1 2 mg/dL    Alk Phos Isoenzymes 85 44 - 121 IU/L    AST 17 0 - 40 IU/L    ALT 15 0 - 44 IU/L   Microalbumin / creatinine urine ratio    Collection Time: 10/13/22  9:34 AM   Result Value Ref Range    Creatinine, Urine 128 5 Not Estab  mg/dL    Microalbum  ,U,Random <3 0 Not Estab  ug/mL    Microalb/Creat Ratio <2 0 - 29 mg/g creat   Hemoglobin A1c (w/out EAG)    Collection Time: 10/13/22  9:34 AM   Result Value Ref Range    Hemoglobin A1C 5 8 (H) 4 8 - 5 6 %   T4, free    Collection Time: 10/13/22  9:34 AM   Result Value Ref Range    Free t4 1 16 0 82 - 1 77 ng/dL   TSH, 3rd generation    Collection Time: 10/13/22  9:34 AM   Result Value Ref Range    TSH 1 870 0 450 - 4 500 uIU/mL   Prolactin    Collection Time: 10/13/22  9:34 AM   Result Value Ref Range    Prolactin 10 6 4 0 - 15 2 ng/mL   PSA Total, Diagnostic    Collection Time: 10/13/22  9:34 AM   Result Value Ref Range    Prostate Specific Antigen Total 0 2 0 0 - 4 0 ng/mL   Insulin-like growth factor 1 (IGF-1)    Collection Time: 10/13/22  9:34 AM   Result Value Ref Range    Insulin-Like GF-1 148 59 - 230 ng/mL   Vitamin D 25 hydroxy    Collection Time: 10/13/22  9:34 AM   Result Value Ref Range    25-HYDROXY VIT D 31 7 30 0 - 100 0 ng/mL   PTH, intact    Collection Time: 10/13/22  9:34 AM   Result Value Ref Range    PTH, Intact 18 15 - 65 pg/mL         No future appointments  Portions of the record may have been created with voice recognition software  Occasional wrong word or "sound a like" substitutions may have occurred due to the inherent limitations of voice recognition software  Read the chart carefully and recognize, using context, where substitutions have occurred

## 2022-11-30 LAB
FERRITIN SERPL-MCNC: 21 NG/ML (ref 30–400)
IRON SATN MFR SERPL: 21 % (ref 15–55)
IRON SERPL-MCNC: 72 UG/DL (ref 38–169)
TESTOST FREE SERPL-MCNC: 7.5 PG/ML (ref 6.6–18.1)
TESTOST SERPL-MCNC: 159 NG/DL (ref 264–916)
TIBC SERPL-MCNC: 348 UG/DL (ref 250–450)
UIBC SERPL-MCNC: 276 UG/DL (ref 111–343)
VIT B12 SERPL-MCNC: 211 PG/ML (ref 232–1245)

## 2022-12-07 ENCOUNTER — TELEPHONE (OUTPATIENT)
Dept: ENDOCRINOLOGY | Facility: CLINIC | Age: 72
End: 2022-12-07

## 2022-12-07 DIAGNOSIS — E11.9 TYPE 2 DIABETES MELLITUS WITHOUT COMPLICATION, WITHOUT LONG-TERM CURRENT USE OF INSULIN (HCC): Primary | ICD-10-CM

## 2022-12-07 DIAGNOSIS — I10 ESSENTIAL HYPERTENSION: ICD-10-CM

## 2022-12-07 DIAGNOSIS — E29.1 HYPOGONADISM IN MALE: ICD-10-CM

## 2022-12-07 NOTE — TELEPHONE ENCOUNTER
----- Message from Ana Mcdonald DO sent at 12/2/2022  5:31 PM EST -----  Please call patient regarding these results: Recent labs showed testosterone was on the low side  Any missed doses of testosterone? Also his iron and B12 was found to be low so I would suggest he discuss this with his PCP or hematology in the setting of anemia

## 2022-12-07 NOTE — TELEPHONE ENCOUNTER
Patient was not taking testosterone about 2 months ago, but has been since then, he also had PCP start him on iron supplement

## 2023-05-08 DIAGNOSIS — E29.1 HYPOGONADISM IN MALE: ICD-10-CM

## 2023-05-08 RX ORDER — TESTOSTERONE 30 MG/1.5ML
SOLUTION TOPICAL
Qty: 90 ACT | Refills: 0 | Status: SHIPPED | OUTPATIENT
Start: 2023-05-08

## 2023-05-08 NOTE — TELEPHONE ENCOUNTER
Requested medication(s) are due for refill today: Yes  Patient has already received a courtesy refill: No  Other reason request has been forwarded to provider: controlled substance  Off-Protocol Failed 05/08/2023 03:02 PM   Protocol Details  Medication not assigned to a protocol, review manually      Valid encounter within last 12 months

## 2023-06-21 LAB — HBA1C MFR BLD HPLC: 6.4 %

## 2023-06-23 LAB
ALBUMIN SERPL-MCNC: 4.4 G/DL (ref 3.7–4.7)
ALBUMIN/GLOB SERPL: 2 {RATIO} (ref 1.2–2.2)
ALP SERPL-CCNC: 86 IU/L (ref 44–121)
ALT SERPL-CCNC: 17 IU/L (ref 0–44)
AST SERPL-CCNC: 13 IU/L (ref 0–40)
BASOPHILS # BLD AUTO: 0 X10E3/UL (ref 0–0.2)
BASOPHILS NFR BLD AUTO: 0 %
BILIRUB SERPL-MCNC: 0.3 MG/DL (ref 0–1.2)
BUN SERPL-MCNC: 23 MG/DL (ref 8–27)
BUN/CREAT SERPL: 16 (ref 10–24)
CALCIUM SERPL-MCNC: 10 MG/DL (ref 8.6–10.2)
CHLORIDE SERPL-SCNC: 106 MMOL/L (ref 96–106)
CHOLEST SERPL-MCNC: 145 MG/DL (ref 100–199)
CHOLEST/HDLC SERPL: 3.3 RATIO (ref 0–5)
CO2 SERPL-SCNC: 25 MMOL/L (ref 20–29)
CREAT SERPL-MCNC: 1.45 MG/DL (ref 0.76–1.27)
EGFR: 52 ML/MIN/1.73
EOSINOPHIL # BLD AUTO: 0.1 X10E3/UL (ref 0–0.4)
EOSINOPHIL NFR BLD AUTO: 1 %
ERYTHROCYTE [DISTWIDTH] IN BLOOD BY AUTOMATED COUNT: 13 % (ref 11.6–15.4)
GLOBULIN SER-MCNC: 2.2 G/DL (ref 1.5–4.5)
GLUCOSE SERPL-MCNC: 168 MG/DL (ref 70–99)
HBA1C MFR BLD: 6.4 % (ref 4.8–5.6)
HCT VFR BLD AUTO: 40 % (ref 37.5–51)
HDLC SERPL-MCNC: 44 MG/DL
HGB BLD-MCNC: 14 G/DL (ref 13–17.7)
IMM GRANULOCYTES # BLD: 0 X10E3/UL (ref 0–0.1)
IMM GRANULOCYTES NFR BLD: 0 %
LDLC SERPL DIRECT ASSAY-MCNC: 84 MG/DL (ref 0–99)
LYMPHOCYTES # BLD AUTO: 2.7 X10E3/UL (ref 0.7–3.1)
LYMPHOCYTES NFR BLD AUTO: 32 %
MCH RBC QN AUTO: 32.3 PG (ref 26.6–33)
MCHC RBC AUTO-ENTMCNC: 35 G/DL (ref 31.5–35.7)
MCV RBC AUTO: 92 FL (ref 79–97)
MONOCYTES # BLD AUTO: 0.8 X10E3/UL (ref 0.1–0.9)
MONOCYTES NFR BLD AUTO: 9 %
NEUTROPHILS # BLD AUTO: 4.7 X10E3/UL (ref 1.4–7)
NEUTROPHILS NFR BLD AUTO: 58 %
PLATELET # BLD AUTO: 148 X10E3/UL (ref 150–450)
POTASSIUM SERPL-SCNC: 4.2 MMOL/L (ref 3.5–5.2)
PROT SERPL-MCNC: 6.6 G/DL (ref 6–8.5)
RBC # BLD AUTO: 4.34 X10E6/UL (ref 4.14–5.8)
SODIUM SERPL-SCNC: 141 MMOL/L (ref 134–144)
TESTOST FREE SERPL-MCNC: 2.7 PG/ML (ref 6.6–18.1)
TESTOST SERPL-MCNC: 90 NG/DL (ref 264–916)
TRIGL SERPL-MCNC: 127 MG/DL (ref 0–149)
TSH SERPL DL<=0.005 MIU/L-ACNC: 2.26 UIU/ML (ref 0.45–4.5)
WBC # BLD AUTO: 8.3 X10E3/UL (ref 3.4–10.8)

## 2023-07-12 DIAGNOSIS — E29.1 HYPOGONADISM IN MALE: ICD-10-CM

## 2023-07-12 RX ORDER — TESTOSTERONE 30 MG/1.5ML
SOLUTION TOPICAL
Qty: 90 ML | Refills: 0 | Status: SHIPPED | OUTPATIENT
Start: 2023-07-12 | End: 2023-09-14 | Stop reason: SDUPTHER

## 2023-07-12 NOTE — TELEPHONE ENCOUNTER
Requested medication(s) are due for refill today: Yes  Patient has already received a courtesy refill: Yes  Other reason request has been forwarded to provider:   Off-Protocol Failed 07/12/2023 11:06 AM   Protocol Details  Medication not assigned to a protocol, review manually.

## 2023-09-14 DIAGNOSIS — E29.1 HYPOGONADISM IN MALE: ICD-10-CM

## 2023-09-14 RX ORDER — TESTOSTERONE 30 MG/1.5ML
SOLUTION TOPICAL
Qty: 90 ML | Refills: 0 | Status: SHIPPED | OUTPATIENT
Start: 2023-09-14

## 2023-11-24 DIAGNOSIS — E29.1 HYPOGONADISM IN MALE: ICD-10-CM

## 2023-11-24 NOTE — TELEPHONE ENCOUNTER
Requested medication(s) are due for refill today: Yes  Patient has already received a courtesy refill: Yes  Other reason request has been forwarded to provider: Failed Protocol

## 2023-11-27 RX ORDER — TESTOSTERONE 30 MG/1.5ML
SOLUTION TOPICAL
Qty: 90 ML | Refills: 0 | Status: SHIPPED | OUTPATIENT
Start: 2023-11-27

## 2023-12-01 ENCOUNTER — OFFICE VISIT (OUTPATIENT)
Dept: ENDOCRINOLOGY | Facility: CLINIC | Age: 73
End: 2023-12-01
Payer: MEDICARE

## 2023-12-01 VITALS
DIASTOLIC BLOOD PRESSURE: 78 MMHG | OXYGEN SATURATION: 98 % | HEART RATE: 76 BPM | WEIGHT: 258.8 LBS | BODY MASS INDEX: 32.18 KG/M2 | HEIGHT: 75 IN | SYSTOLIC BLOOD PRESSURE: 128 MMHG

## 2023-12-01 DIAGNOSIS — Z12.5 PROSTATE CANCER SCREENING: ICD-10-CM

## 2023-12-01 DIAGNOSIS — E11.9 TYPE 2 DIABETES MELLITUS WITHOUT COMPLICATION, WITHOUT LONG-TERM CURRENT USE OF INSULIN (HCC): Primary | ICD-10-CM

## 2023-12-01 DIAGNOSIS — E78.5 HYPERLIPIDEMIA, UNSPECIFIED HYPERLIPIDEMIA TYPE: ICD-10-CM

## 2023-12-01 DIAGNOSIS — E29.1 HYPOGONADISM IN MALE: ICD-10-CM

## 2023-12-01 DIAGNOSIS — I10 ESSENTIAL HYPERTENSION: ICD-10-CM

## 2023-12-01 DIAGNOSIS — Z86.018 HISTORY OF PROLACTINOMA: ICD-10-CM

## 2023-12-01 DIAGNOSIS — N52.9 ERECTILE DYSFUNCTION, UNSPECIFIED ERECTILE DYSFUNCTION TYPE: ICD-10-CM

## 2023-12-01 LAB — SL AMB POCT HEMOGLOBIN AIC: 6.5 (ref ?–6.5)

## 2023-12-01 PROCEDURE — 83036 HEMOGLOBIN GLYCOSYLATED A1C: CPT | Performed by: INTERNAL MEDICINE

## 2023-12-01 PROCEDURE — 99214 OFFICE O/P EST MOD 30 MIN: CPT | Performed by: INTERNAL MEDICINE

## 2023-12-01 RX ORDER — VARDENAFIL HYDROCHLORIDE 10 MG/1
10 TABLET ORAL DAILY PRN
Qty: 10 TABLET | Refills: 0 | Status: SHIPPED | OUTPATIENT
Start: 2023-12-01

## 2023-12-06 ENCOUNTER — TELEPHONE (OUTPATIENT)
Dept: ENDOCRINOLOGY | Facility: CLINIC | Age: 73
End: 2023-12-06

## 2023-12-06 NOTE — TELEPHONE ENCOUNTER
Bro Wilson (Key: [id number])  Need help? Call us at [phone]    Status   Sent to [facility]  [employer]   The plan will fax you a determination, typically within 1 to 5 business days.  How do I follow up?  Drug    Testosterone 30MG/ACT solution   Form    [employer] Medicare Coverage Determination Form    Prior Authorization Form for Medicare Members      [phone]    [phone]

## 2023-12-19 NOTE — TELEPHONE ENCOUNTER
Patient has been notified that [facility] Medicare does not have him on file and he will call for clarification with his plan.

## 2024-01-12 ENCOUNTER — TELEPHONE (OUTPATIENT)
Dept: ENDOCRINOLOGY | Facility: CLINIC | Age: 74
End: 2024-01-12

## 2024-01-12 NOTE — TELEPHONE ENCOUNTER
SADAF CROUCH (Key: [id number])  Need Help? Call us at [phone]  Outcome  Additional Information Required  The patient currently has access to the requested medication and a Prior Authorization is not needed for the patient/medication.  Drug  Testosterone 30MG/ACT solution    Form  [employer] Electronic PA Form

## 2024-01-17 LAB
CREAT ?TM UR-SCNC: 103.8 UMOL/L
EXT ALBUMIN URINE RANDOM: 5.5
HBA1C MFR BLD HPLC: 6.9 %
MICROALBUMIN/CREAT UR: 5 MG/G{CREAT}

## 2024-03-06 DIAGNOSIS — E29.1 HYPOGONADISM IN MALE: ICD-10-CM

## 2024-03-07 RX ORDER — TESTOSTERONE 30 MG/1.5ML
SOLUTION TOPICAL
Qty: 90 ML | Refills: 0 | Status: SHIPPED | OUTPATIENT
Start: 2024-03-07

## 2024-04-14 DIAGNOSIS — E29.1 HYPOGONADISM IN MALE: ICD-10-CM

## 2024-04-15 RX ORDER — TESTOSTERONE 30 MG/1.5ML
SOLUTION TOPICAL
Qty: 90 ML | Refills: 0 | Status: SHIPPED | OUTPATIENT
Start: 2024-04-15

## 2024-04-23 ENCOUNTER — TELEPHONE (OUTPATIENT)
Age: 74
End: 2024-04-23

## 2024-04-23 NOTE — TELEPHONE ENCOUNTER
Patient called regarding his thyroid US. He said nodules were found on the US and his ordering Dr put in for a Biopsy to be done. I gave him the number to get that scheduled, however he was wondering if Dr. Vargas thinks he should be seen sooner then his June appt due to the need for the thyroid biopsy. Please advise. Thank you.

## 2024-05-07 ENCOUNTER — HOSPITAL ENCOUNTER (OUTPATIENT)
Dept: ULTRASOUND IMAGING | Facility: HOSPITAL | Age: 74
Discharge: HOME | End: 2024-05-07
Payer: MEDICARE

## 2024-05-07 DIAGNOSIS — E04.1 NONTOXIC SINGLE THYROID NODULE: ICD-10-CM

## 2024-05-07 PROCEDURE — 10005 FNA BX W/US GDN 1ST LES: CPT

## 2024-05-07 PROCEDURE — 88173 CYTOPATH EVAL FNA REPORT: CPT | Performed by: PATHOLOGY

## 2024-05-07 PROCEDURE — 88172 CYTP DX EVAL FNA 1ST EA SITE: CPT | Performed by: PATHOLOGY

## 2024-05-07 RX ORDER — LIDOCAINE HYDROCHLORIDE 10 MG/ML
5 INJECTION, SOLUTION EPIDURAL; INFILTRATION; INTRACAUDAL; PERINEURAL ONCE
Status: COMPLETED | OUTPATIENT
Start: 2024-05-07 | End: 2024-05-07

## 2024-05-07 RX ADMIN — LIDOCAINE HYDROCHLORIDE 5 ML: 10 INJECTION, SOLUTION EPIDURAL; INFILTRATION; INTRACAUDAL; PERINEURAL at 09:30

## 2024-05-10 PROCEDURE — 88172 CYTP DX EVAL FNA 1ST EA SITE: CPT | Performed by: PATHOLOGY

## 2024-05-10 PROCEDURE — 88173 CYTOPATH EVAL FNA REPORT: CPT | Performed by: PATHOLOGY

## 2024-05-10 PROCEDURE — 88112 CYTOPATH CELL ENHANCE TECH: CPT | Performed by: PATHOLOGY

## 2024-05-31 LAB
CREAT ?TM UR-SCNC: 148.2 UMOL/L
EXT ALBUMIN URINE RANDOM: 12.5
MICROALBUMIN/CREAT UR: 8 MG/G{CREAT}

## 2024-06-01 LAB
ALBUMIN SERPL-MCNC: 4.2 G/DL (ref 3.8–4.8)
ALBUMIN/CREAT UR: 8 MG/G CREAT (ref 0–29)
ALBUMIN/GLOB SERPL: 1.9 {RATIO} (ref 1.2–2.2)
ALP SERPL-CCNC: 65 IU/L (ref 44–121)
ALT SERPL-CCNC: 17 IU/L (ref 0–44)
AST SERPL-CCNC: 15 IU/L (ref 0–40)
BASOPHILS # BLD AUTO: 0 X10E3/UL (ref 0–0.2)
BASOPHILS NFR BLD AUTO: 0 %
BILIRUB SERPL-MCNC: 0.4 MG/DL (ref 0–1.2)
BUN SERPL-MCNC: 20 MG/DL (ref 8–27)
BUN/CREAT SERPL: 15 (ref 10–24)
CALCIUM SERPL-MCNC: 9.5 MG/DL (ref 8.6–10.2)
CHLORIDE SERPL-SCNC: 103 MMOL/L (ref 96–106)
CHOLEST SERPL-MCNC: 126 MG/DL (ref 100–199)
CO2 SERPL-SCNC: 24 MMOL/L (ref 20–29)
CREAT SERPL-MCNC: 1.3 MG/DL (ref 0.76–1.27)
CREAT UR-MCNC: 148.2 MG/DL
EGFR: 58 ML/MIN/1.73
EOSINOPHIL # BLD AUTO: 0.1 X10E3/UL (ref 0–0.4)
EOSINOPHIL NFR BLD AUTO: 1 %
ERYTHROCYTE [DISTWIDTH] IN BLOOD BY AUTOMATED COUNT: 12.8 % (ref 11.6–15.4)
GLOBULIN SER-MCNC: 2.2 G/DL (ref 1.5–4.5)
GLUCOSE SERPL-MCNC: 134 MG/DL (ref 70–99)
HCT VFR BLD AUTO: 42.9 % (ref 37.5–51)
HDLC SERPL-MCNC: 39 MG/DL
HGB BLD-MCNC: 14.5 G/DL (ref 13–17.7)
IMM GRANULOCYTES # BLD: 0 X10E3/UL (ref 0–0.1)
IMM GRANULOCYTES NFR BLD: 0 %
LDLC SERPL CALC-MCNC: 72 MG/DL (ref 0–99)
LDLC/HDLC SERPL: 1.8 RATIO (ref 0–3.6)
LYMPHOCYTES # BLD AUTO: 3.1 X10E3/UL (ref 0.7–3.1)
LYMPHOCYTES NFR BLD AUTO: 35 %
MCH RBC QN AUTO: 31.5 PG (ref 26.6–33)
MCHC RBC AUTO-ENTMCNC: 33.8 G/DL (ref 31.5–35.7)
MCV RBC AUTO: 93 FL (ref 79–97)
MICROALBUMIN UR-MCNC: 12.5 UG/ML
MONOCYTES # BLD AUTO: 0.7 X10E3/UL (ref 0.1–0.9)
MONOCYTES NFR BLD AUTO: 8 %
NEUTROPHILS # BLD AUTO: 4.8 X10E3/UL (ref 1.4–7)
NEUTROPHILS NFR BLD AUTO: 56 %
PLATELET # BLD AUTO: 142 X10E3/UL (ref 150–450)
POTASSIUM SERPL-SCNC: 4.5 MMOL/L (ref 3.5–5.2)
PROLACTIN SERPL-MCNC: 9.1 NG/ML (ref 3.6–25.2)
PROT SERPL-MCNC: 6.4 G/DL (ref 6–8.5)
PSA SERPL-MCNC: 0.4 NG/ML (ref 0–4)
RBC # BLD AUTO: 4.61 X10E6/UL (ref 4.14–5.8)
SL AMB VLDL CHOLESTEROL CALC: 15 MG/DL (ref 5–40)
SODIUM SERPL-SCNC: 140 MMOL/L (ref 134–144)
TESTOST FREE SERPL-MCNC: 2.7 PG/ML (ref 6.6–18.1)
TESTOST SERPL-MCNC: 116 NG/DL (ref 264–916)
TRIGL SERPL-MCNC: 74 MG/DL (ref 0–149)
TSH SERPL DL<=0.005 MIU/L-ACNC: 1.44 UIU/ML (ref 0.45–4.5)
WBC # BLD AUTO: 8.8 X10E3/UL (ref 3.4–10.8)

## 2024-06-03 ENCOUNTER — TELEPHONE (OUTPATIENT)
Age: 74
End: 2024-06-03

## 2024-06-03 NOTE — TELEPHONE ENCOUNTER
Phone call from patient scheduled to see Dr. Vargas on Friday 6/7/24. However, patient had a biopsy done at [facility] like 3 weeks ago, and has not received results. Please check for status on biopsy, and also if patient should keep his appt on Friday since Biopsy results are still pending. Contact patient.

## 2024-06-03 NOTE — TELEPHONE ENCOUNTER
The initial biopsy returned indeterminate but the follow up testing is still pending.  He can delay his follow up appointment until 1-2 months down the road if he would prefer.  He can also keep his Friday appointment, it is up to the patient.

## 2024-06-07 ENCOUNTER — TELEPHONE (OUTPATIENT)
Dept: ENDOCRINOLOGY | Facility: CLINIC | Age: 74
End: 2024-06-07

## 2024-06-07 NOTE — TELEPHONE ENCOUNTER
Initial result  returned indeterminate but follow-up testing returned benign.  Can discuss at follow-up visits when to repeat thyroid ultrasound surveillance.

## 2024-06-21 DIAGNOSIS — E29.1 HYPOGONADISM IN MALE: ICD-10-CM

## 2024-06-26 RX ORDER — TESTOSTERONE 30 MG/1.5ML
SOLUTION TOPICAL
Qty: 90 ML | Refills: 0 | Status: SHIPPED | OUTPATIENT
Start: 2024-06-26

## 2024-06-28 ENCOUNTER — OFFICE VISIT (OUTPATIENT)
Dept: ENDOCRINOLOGY | Facility: CLINIC | Age: 74
End: 2024-06-28
Payer: MEDICARE

## 2024-06-28 VITALS
DIASTOLIC BLOOD PRESSURE: 76 MMHG | OXYGEN SATURATION: 98 % | HEIGHT: 75 IN | WEIGHT: 256 LBS | SYSTOLIC BLOOD PRESSURE: 122 MMHG | HEART RATE: 77 BPM | BODY MASS INDEX: 31.83 KG/M2

## 2024-06-28 DIAGNOSIS — E29.1 HYPOGONADISM IN MALE: ICD-10-CM

## 2024-06-28 DIAGNOSIS — E04.1 THYROID NODULE: ICD-10-CM

## 2024-06-28 DIAGNOSIS — I10 ESSENTIAL HYPERTENSION: ICD-10-CM

## 2024-06-28 DIAGNOSIS — Z86.018 HISTORY OF PROLACTINOMA: ICD-10-CM

## 2024-06-28 DIAGNOSIS — E78.5 HYPERLIPIDEMIA, UNSPECIFIED HYPERLIPIDEMIA TYPE: ICD-10-CM

## 2024-06-28 DIAGNOSIS — N18.31 TYPE 2 DIABETES MELLITUS WITH STAGE 3A CHRONIC KIDNEY DISEASE, WITHOUT LONG-TERM CURRENT USE OF INSULIN (HCC): Primary | ICD-10-CM

## 2024-06-28 DIAGNOSIS — E11.22 TYPE 2 DIABETES MELLITUS WITH STAGE 3A CHRONIC KIDNEY DISEASE, WITHOUT LONG-TERM CURRENT USE OF INSULIN (HCC): Primary | ICD-10-CM

## 2024-06-28 LAB — SL AMB POCT HEMOGLOBIN AIC: 6.4 (ref ?–6.5)

## 2024-06-28 PROCEDURE — 83036 HEMOGLOBIN GLYCOSYLATED A1C: CPT

## 2024-06-28 PROCEDURE — 99214 OFFICE O/P EST MOD 30 MIN: CPT

## 2024-06-28 NOTE — ASSESSMENT & PLAN NOTE
Testosterone level slightly low but improving. He is overall feeling well and has no complaints. Will continue with current dose of testosterone at this time and will continue to monitor.

## 2024-06-28 NOTE — ASSESSMENT & PLAN NOTE
This was benign on Afirma testing. Due to size of nodule will continue to monitor with yearly US. He will notify us if he experiences any compressive symptoms.

## 2024-06-28 NOTE — PROGRESS NOTES
Established Patient Progress Note      Chief Complaint   Patient presents with    Diabetes Type 2    Hypogonadism        Impression & Plan:    Problem List Items Addressed This Visit          Cardiovascular and Mediastinum    Essential hypertension     BP at goal. Continue current medication regimen.              Endocrine    Hypogonadism in male     Testosterone level slightly low but improving. He is overall feeling well and has no complaints. Will continue with current dose of testosterone at this time and will continue to monitor.          Relevant Orders    Testosterone, free, total    CBC and differential    Type 2 diabetes mellitus with stage 3a chronic kidney disease, without long-term current use of insulin ([facility]) - Primary     HGA1C at goal. No BGs to review.   Treatment regimen:   - Will start him on GLP-1 therapy for assistance in diabetes control and weight loss. He denies hx of pancreatitis or family/personally hx of medullary thyroid cancer. I have ordered ozempic 0.25 mg weekly for 1 month, if tolerating will increase to 0.5 mg weekly. Reviewed side effects in detail.   - He did not tolerate SGLT-2 in the past.  - Continue with Metformin at current dose. Will likely decrease dose once on ozempic.   - Recommend he check BGs daily.   Discussed risks/complications associated with uncontrolled diabetes.    Advised to adhere to diabetic diet, and recommended staying active/exercising routinely.  Keep carbohydrates consistent to limit blood glucose fluctuations.  Advised to call if blood sugars less than 70 mg/dl or over 300 mg/dl.   Discussed symptoms and treatment of hypoglycemia.    Recommended routine follow-up with podiatry and ophthalmology.   Ordered blood work to complete prior to next visit.   Lab Results   Component Value Date    HGBA1C 6.4 06/28/2024            Relevant Medications    semaglutide, 0.25 or 0.5 mg/dose, (Ozempic, 0.25 or 0.5 MG/DOSE,) 2 mg/3 mL injection pen    Other Relevant  Orders    POCT hemoglobin A1c (Completed)    Hemoglobin A1C    Comprehensive metabolic panel    Thyroid nodule     This was benign on Afirma testing. Due to size of nodule will continue to monitor with yearly US. He will notify us if he experiences any compressive symptoms.          Relevant Orders    US thyroid       Oncology    History of prolactinoma     This has resolved. Will continue to monitor periodically.             Other    Hyperlipidemia     Lipid panel at goal. Continue statin.              History of Present Illness:   Bro Wilson is a 73 y.o. male with hypertension, hyperlipidemia, hypogonadism, thyroid nodules and type 2 diabetes seen in follow up. Reports complications of neuropathy and CKD. Denies recent severe hypoglycemic or severe hyperglycemic episodes. Denies any issues with his current regimen. POCT A1C was 6.4. Home glucose monitoring: are not performed.     He  also has a history of prolactinoma that was treated with dopamine agonist therapy for many years.  This was stopped after prolactin levels were controlled an MRI in August of 2017 showed no evidence of pituitary lesion.  Testosterone level did not improve after correction of prolactin so he continues on testosterone supplementation with 30 mg per actuation, 3 pumps daily.      Had thyroid US in April 2024 which showed 3.4 cm nodule on left lower lobe. FNA done in May which demonstrated [place] III. Sent for Afirma which was benign. He denies any compressive symptoms.     Current regimen:   Metformin 1000 mg BID    Last Eye Exam: UTD  Last Foot Exam: UTD, follows with podiatry     Has hypertension: Taking losartan  Has hyperlipidemia: Taking simvastatin       Patient Active Problem List   Diagnosis    History of prolactinoma    Hypercalcemia    Hypogonadism in male    Type 2 diabetes mellitus with stage 3a chronic kidney disease, without long-term current use of insulin (HCC)    Hyperlipidemia    Essential hypertension    Stage 3a  chronic kidney disease (HCC)    Thyroid nodule      History reviewed. No pertinent past medical history.   Past Surgical History:   Procedure Laterality Date    ORIF PROXIMAL TIBIAL PLATEAU FRACTURE Left     US GUIDED THYROID BIOPSY  2024      Social History     Tobacco Use    Smoking status: Former     Current packs/day: 0.00     Average packs/day: 0.5 packs/day for 25.2 years (12.6 ttl pk-yrs)     Types: Cigarettes     Start date: 10/20/1975     Quit date: 2001     Years since quittin.5    Smokeless tobacco: Never   Substance Use Topics    Alcohol use: Not Currently     Allergies   Allergen Reactions    Contrast [Iodinated Contrast Media] Shortness Of Breath and Itching         Current Outpatient Medications:     Blood Glucose Monitoring Suppl (FREESTYLE FREEDOM LITE) w/Device KIT, Test blood sugar once daily, Disp: 1 each, Rfl: 0    cholecalciferol (VITAMIN D3) 1,000 units tablet, Take 1,000 Units by mouth daily, Disp: , Rfl:     escitalopram (LEXAPRO) 20 mg tablet, Take 20 mg by mouth daily, Disp: , Rfl:     famotidine (PEPCID) 40 MG tablet, Take 40 mg by mouth 2 (two) times a day as needed for heartburn or indigestion, Disp: , Rfl:     fluticasone (FLONASE) 50 mcg/act nasal spray, 1 spray into each nostril daily, Disp: , Rfl:     glucose blood (FREESTYLE LITE) test strip, Test blood sugar once daily, Disp: 100 each, Rfl: 5    Lancets (FREESTYLE) lancets, Test blood sugar once daily, Disp: 100 each, Rfl: 5    losartan (COZAAR) 100 MG tablet, Take 100 mg by mouth daily, Disp: , Rfl:     metFORMIN (GLUCOPHAGE) 1000 MG tablet, Take 1,000 mg by mouth 2 (two) times a day with meals, Disp: , Rfl: 1    semaglutide, 0.25 or 0.5 mg/dose, (Ozempic, 0.25 or 0.5 MG/DOSE,) 2 mg/3 mL injection pen, Inject 0.25 mg weekly for 4 weeks and then increase to 0.5 mg weekly., Disp: 3 mL, Rfl: 2    simvastatin (ZOCOR) 40 mg tablet, Take 40 mg by mouth daily, Disp: , Rfl: 2    Testosterone 30 MG/ACT SOLN, APPLY 3 PUMPS  "TOTAL DAILY, Disp: 90 mL, Rfl: 0    vardenafil (LEVITRA) 10 MG tablet, Take 1 tablet (10 mg total) by mouth daily as needed for erectile dysfunction, Disp: 10 tablet, Rfl: 0    Review of Systems   Constitutional:  Negative for activity change, appetite change, chills, diaphoresis, fatigue, fever and unexpected weight change.   HENT:  Negative for sore throat, trouble swallowing and voice change.    Eyes:  Negative for visual disturbance.   Respiratory:  Negative for chest tightness and shortness of breath.    Cardiovascular:  Negative for chest pain, palpitations and leg swelling.   Gastrointestinal:  Negative for abdominal pain, constipation, diarrhea, nausea and vomiting.   Endocrine: Negative for polydipsia, polyphagia and polyuria.   Skin:  Negative for color change, pallor, rash and wound.   Neurological:  Positive for numbness (b/l feet). Negative for dizziness, tremors, weakness and light-headedness.   All other systems reviewed and are negative.      Physical Exam:  Body mass index is 32.43 kg/m².  /76   Pulse 77   Ht 6' 2.5\" (1.892 m)   Wt 116 kg (256 lb)   SpO2 98%   BMI 32.43 kg/m²    Wt Readings from Last 3 Encounters:   06/28/24 116 kg (256 lb)   12/01/23 117 kg (258 lb 12.8 oz)   10/21/22 111 kg (245 lb 9.6 oz)       Physical Exam  Vitals reviewed.   Constitutional:       Appearance: Normal appearance. He is obese.   Cardiovascular:      Rate and Rhythm: Normal rate.   Pulmonary:      Effort: Pulmonary effort is normal.   Neurological:      Mental Status: He is alert and oriented to person, place, and time.   Psychiatric:         Mood and Affect: Mood normal.         Thought Content: Thought content normal.         Labs:   Lab Results   Component Value Date    HGBA1C 6.4 06/28/2024    HGBA1C 6.9 01/17/2024    HGBA1C 6.5 12/01/2023     Lab Results   Component Value Date    CREATININE 1.30 (H) 05/31/2024    CREATININE 1.45 (H) 06/21/2023    CREATININE 1.48 (H) 10/13/2022    BUN 20 05/31/2024 " "   K 4.5 05/31/2024     05/31/2024    CO2 24 05/31/2024     eGFR   Date Value Ref Range Status   05/31/2024 58 (L) >59 mL/min/1.73 Final     Lab Results   Component Value Date    HDL 39 (L) 05/31/2024    TRIG 74 05/31/2024    CHOLHDL 3.3 06/21/2023     Lab Results   Component Value Date    ALT 17 05/31/2024    AST 15 05/31/2024     No results found for: \"[id number]\"    Patient Instructions   Start Ozempic at 0.25 mg weekly for 1 month, increase to 0.5 mg weekly if tolerating.   Continue with Metformin.     Discussed with the patient and all questioned fully answered. He will call me if any problems arise.    JONH Herzog  "

## 2024-06-28 NOTE — ASSESSMENT & PLAN NOTE
HGA1C at goal. No BGs to review.   Treatment regimen:   - Will start him on GLP-1 therapy for assistance in diabetes control and weight loss. He denies hx of pancreatitis or family/personally hx of medullary thyroid cancer. I have ordered ozempic 0.25 mg weekly for 1 month, if tolerating will increase to 0.5 mg weekly. Reviewed side effects in detail.   - He did not tolerate SGLT-2 in the past.  - Continue with Metformin at current dose. Will likely decrease dose once on ozempic.   - Recommend he check BGs daily.   Discussed risks/complications associated with uncontrolled diabetes.    Advised to adhere to diabetic diet, and recommended staying active/exercising routinely.  Keep carbohydrates consistent to limit blood glucose fluctuations.  Advised to call if blood sugars less than 70 mg/dl or over 300 mg/dl.   Discussed symptoms and treatment of hypoglycemia.    Recommended routine follow-up with podiatry and ophthalmology.   Ordered blood work to complete prior to next visit.   Lab Results   Component Value Date    HGBA1C 6.4 06/28/2024

## 2024-06-28 NOTE — PATIENT INSTRUCTIONS
Start Ozempic at 0.25 mg weekly for 1 month, increase to 0.5 mg weekly if tolerating.   Continue with Metformin.

## 2024-08-12 DIAGNOSIS — E29.1 HYPOGONADISM IN MALE: ICD-10-CM

## 2024-08-14 RX ORDER — TESTOSTERONE 30 MG/1.5ML
SOLUTION TOPICAL
Qty: 90 ML | Refills: 0 | Status: SHIPPED | OUTPATIENT
Start: 2024-08-14

## 2024-09-14 LAB
ALBUMIN SERPL-MCNC: 4 G/DL (ref 3.8–4.8)
ALP SERPL-CCNC: 63 IU/L (ref 44–121)
ALT SERPL-CCNC: 20 IU/L (ref 0–44)
AST SERPL-CCNC: 20 IU/L (ref 0–40)
BASOPHILS # BLD AUTO: 0 X10E3/UL (ref 0–0.2)
BASOPHILS NFR BLD AUTO: 0 %
BILIRUB SERPL-MCNC: 0.7 MG/DL (ref 0–1.2)
BUN SERPL-MCNC: 17 MG/DL (ref 8–27)
BUN/CREAT SERPL: 13 (ref 10–24)
CALCIUM SERPL-MCNC: 9.6 MG/DL (ref 8.6–10.2)
CHLORIDE SERPL-SCNC: 102 MMOL/L (ref 96–106)
CO2 SERPL-SCNC: 24 MMOL/L (ref 20–29)
CREAT SERPL-MCNC: 1.33 MG/DL (ref 0.76–1.27)
EGFR: 57 ML/MIN/1.73
EOSINOPHIL # BLD AUTO: 0.1 X10E3/UL (ref 0–0.4)
EOSINOPHIL NFR BLD AUTO: 1 %
ERYTHROCYTE [DISTWIDTH] IN BLOOD BY AUTOMATED COUNT: 12.8 % (ref 11.6–15.4)
GLOBULIN SER-MCNC: 2 G/DL (ref 1.5–4.5)
GLUCOSE SERPL-MCNC: 150 MG/DL (ref 70–99)
HBA1C MFR BLD: 6.6 % (ref 4.8–5.6)
HCT VFR BLD AUTO: 42.4 % (ref 37.5–51)
HGB BLD-MCNC: 14.4 G/DL (ref 13–17.7)
IMM GRANULOCYTES # BLD: 0 X10E3/UL (ref 0–0.1)
IMM GRANULOCYTES NFR BLD: 0 %
LYMPHOCYTES # BLD AUTO: 2.9 X10E3/UL (ref 0.7–3.1)
LYMPHOCYTES NFR BLD AUTO: 34 %
MCH RBC QN AUTO: 32.1 PG (ref 26.6–33)
MCHC RBC AUTO-ENTMCNC: 34 G/DL (ref 31.5–35.7)
MCV RBC AUTO: 95 FL (ref 79–97)
MONOCYTES # BLD AUTO: 0.7 X10E3/UL (ref 0.1–0.9)
MONOCYTES NFR BLD AUTO: 8 %
NEUTROPHILS # BLD AUTO: 4.7 X10E3/UL (ref 1.4–7)
NEUTROPHILS NFR BLD AUTO: 57 %
PLATELET # BLD AUTO: 132 X10E3/UL (ref 150–450)
POTASSIUM SERPL-SCNC: 4.7 MMOL/L (ref 3.5–5.2)
PROT SERPL-MCNC: 6 G/DL (ref 6–8.5)
RBC # BLD AUTO: 4.48 X10E6/UL (ref 4.14–5.8)
SODIUM SERPL-SCNC: 139 MMOL/L (ref 134–144)
TESTOST FREE SERPL-MCNC: 5.6 PG/ML (ref 6.6–18.1)
TESTOST SERPL-MCNC: 191 NG/DL (ref 264–916)
WBC # BLD AUTO: 8.4 X10E3/UL (ref 3.4–10.8)

## 2024-09-27 DIAGNOSIS — E11.22 TYPE 2 DIABETES MELLITUS WITH STAGE 3A CHRONIC KIDNEY DISEASE, WITHOUT LONG-TERM CURRENT USE OF INSULIN (HCC): ICD-10-CM

## 2024-09-27 DIAGNOSIS — N18.31 TYPE 2 DIABETES MELLITUS WITH STAGE 3A CHRONIC KIDNEY DISEASE, WITHOUT LONG-TERM CURRENT USE OF INSULIN (HCC): ICD-10-CM

## 2024-09-30 DIAGNOSIS — E29.1 HYPOGONADISM IN MALE: ICD-10-CM

## 2024-09-30 NOTE — TELEPHONE ENCOUNTER
Pharmacy already sent in a refill request for Ozempic on 9/27/24.    Reason for call:   [x] Refill   [] Prior Auth  [] Other:     Office:   [] PCP/Provider -   [x] Specialty/Provider - Endo    Medication: Testosterone 30 MG/ACT SOLN     Dose/Frequency: APPLY 3 PUMPS TOTAL DAILY   Quantity: 90 ml    Pharmacy: [facility]/pharmacy #[id number] - [place] - [address]     Does the patient have enough for 3 days?   [x] Yes   [] No - Send as HP to POD

## 2024-09-30 NOTE — TELEPHONE ENCOUNTER
Medication: Testosterone  [facility] [id number] 08/14/2024 08/14/2024 Testosterone (Solution) 90.0 30 30 MG/1.5 ML APRIL UPTON [facility] Medicare 0 / 0 PA   1 [id number] 06/26/2024 06/26/2024 Testosterone (Solution) 90.0 20 30 MG/1.5 ML CINDY SMITH [facility] Medicare 0 / 0 PA   1 [id number] 04/15/2024 04/15/2024 Testosterone (Solution) 90.0 20 30 MG/1.5 ML APRIL SANCHEZ WHITE [facility] Medicare 0 / 0 PA

## 2024-10-01 DIAGNOSIS — E29.1 HYPOGONADISM IN MALE: ICD-10-CM

## 2024-10-01 RX ORDER — TESTOSTERONE 30 MG/1.5ML
SOLUTION TOPICAL
Qty: 90 ML | Refills: 0 | Status: SHIPPED | OUTPATIENT
Start: 2024-10-01

## 2024-10-02 RX ORDER — TESTOSTERONE 30 MG/1.5ML
SOLUTION TOPICAL
Qty: 90 ML | Refills: 1 | Status: SHIPPED | OUTPATIENT
Start: 2024-10-02

## 2024-10-04 ENCOUNTER — TELEPHONE (OUTPATIENT)
Age: 74
End: 2024-10-04

## 2024-10-04 NOTE — TELEPHONE ENCOUNTER
PA for Testosterone 30 MG/ACT SOLN  NOT REQUIRED     Reason TOO SOON TO REFILL          Patient advised by          [] MyChart Message  [] Phone call   []LMOM  []L/M to call office as no active Communication consent on file  []Unable to leave detailed message as VM not approved on Communication consent       Pharmacy advised by    []Fax  []Phone call    PAID CLAIM FOR 10/1/24

## 2024-10-15 ENCOUNTER — OFFICE VISIT (OUTPATIENT)
Dept: ENDOCRINOLOGY | Facility: CLINIC | Age: 74
End: 2024-10-15
Payer: MEDICARE

## 2024-10-15 VITALS
OXYGEN SATURATION: 98 % | WEIGHT: 253 LBS | DIASTOLIC BLOOD PRESSURE: 88 MMHG | HEART RATE: 78 BPM | BODY MASS INDEX: 32.47 KG/M2 | SYSTOLIC BLOOD PRESSURE: 128 MMHG | HEIGHT: 74 IN

## 2024-10-15 DIAGNOSIS — E29.1 HYPOGONADISM IN MALE: ICD-10-CM

## 2024-10-15 DIAGNOSIS — E11.22 TYPE 2 DIABETES MELLITUS WITH STAGE 3A CHRONIC KIDNEY DISEASE, WITHOUT LONG-TERM CURRENT USE OF INSULIN (HCC): Primary | ICD-10-CM

## 2024-10-15 DIAGNOSIS — E78.5 HYPERLIPIDEMIA, UNSPECIFIED HYPERLIPIDEMIA TYPE: ICD-10-CM

## 2024-10-15 DIAGNOSIS — N18.31 TYPE 2 DIABETES MELLITUS WITH STAGE 3A CHRONIC KIDNEY DISEASE, WITHOUT LONG-TERM CURRENT USE OF INSULIN (HCC): Primary | ICD-10-CM

## 2024-10-15 DIAGNOSIS — E04.1 THYROID NODULE: ICD-10-CM

## 2024-10-15 DIAGNOSIS — I10 ESSENTIAL HYPERTENSION: ICD-10-CM

## 2024-10-15 PROCEDURE — 99214 OFFICE O/P EST MOD 30 MIN: CPT

## 2024-10-15 NOTE — PATIENT INSTRUCTIONS
Double up on Ozempic 1 mg weekly   Continue with metformin at current dose  Continue with Testosterone at current dose  Plan to repeat thyroid US in May

## 2024-10-15 NOTE — ASSESSMENT & PLAN NOTE
This was benign on Afirma testing. Due to size of nodule will continue to monitor with yearly US. He will be due for repeat in May 2025. This was ordered at previous appointment. He will notify us if he experiences any compressive symptoms.

## 2024-10-15 NOTE — PROGRESS NOTES
Established Patient Progress Note      Chief Complaint   Patient presents with    Diabetes Type 2        Impression & Plan:    Problem List Items Addressed This Visit          Cardiovascular and Mediastinum    Essential hypertension     BP at goal. Continue current medication regimen.              Endocrine    Hypogonadism in male     Testosterone level slightly low but improving. He is overall feeling well and has no complaints. Will continue with current dose of testosterone at this time and will continue to monitor.          Relevant Orders    Testosterone, free, total    Type 2 diabetes mellitus with stage 3a chronic kidney disease, without long-term current use of insulin ([facility]) - Primary     HGA1C at goal. No BGs to review.   Treatment regimen:   - He is tolerating Ozempic without any issue, however he has not had any weight loss. Will plan to increase to 1 mg weekly. Consider switching to Mounjaro if still no success.   - He did not tolerate SGLT-2 in the past.  - Continue with Metformin at current dose. Will likely decrease dose once he increased ozempic.   - Recommend he check BGs daily.   Discussed risks/complications associated with uncontrolled diabetes.    Advised to adhere to diabetic diet, and recommended staying active/exercising routinely.  Keep carbohydrates consistent to limit blood glucose fluctuations.  Advised to call if blood sugars less than 70 mg/dl or over 300 mg/dl.   Discussed symptoms and treatment of hypoglycemia.    Recommended routine follow-up with podiatry and ophthalmology.   Ordered blood work to complete prior to next visit.   Lab Results   Component Value Date    HGBA1C 6.6 (H) 09/13/2024            Relevant Orders    Albumin / creatinine urine ratio    Hemoglobin A1C    Comprehensive metabolic panel    Lipid panel    Thyroid nodule     This was benign on Afirma testing. Due to size of nodule will continue to monitor with yearly US. He will be due for repeat in May 2025. This was  ordered at previous appointment. He will notify us if he experiences any compressive symptoms.             Other    Hyperlipidemia     Continue on statin. Plan to repeat labs prior to next appointment.             History of Present Illness:   Bro Wilson is a 73 y.o. male with hypertension, hyperlipidemia, hypogonadism, thyroid nodules and type 2 diabetes seen in follow up. Reports complications of neuropathy and CKD. Denies recent severe hypoglycemic or severe hyperglycemic episodes. Denies any issues with his current regimen. Last A1C was 6.6. Home glucose monitoring: are not performed.      He  also has a history of prolactinoma that was treated with dopamine agonist therapy for many years.  This was stopped after prolactin levels were controlled an MRI in August of 2017 showed no evidence of pituitary lesion.  Testosterone level did not improve after correction of prolactin so he continues on testosterone supplementation with 30 mg per actuation, 3 pumps daily.       Had thyroid US in April 2024 which showed 3.4 cm nodule on left lower lobe. FNA done in May which demonstrated [place] III. Sent for Afirma which was benign. He denies any compressive symptoms.      Current regimen:   Ozempic 0.5 mg weekly   Metformin 1,000 mg twice daily     Last Eye Exam: UTD  Last Foot Exam: UTD, follows with podiatry     Has hypertension: Taking losartan  Has hyperlipidemia: Taking simvastatin       Patient Active Problem List   Diagnosis    History of prolactinoma    Hypercalcemia    Hypogonadism in male    Type 2 diabetes mellitus with stage 3a chronic kidney disease, without long-term current use of insulin (HCC)    Hyperlipidemia    Essential hypertension    Stage 3a chronic kidney disease (HCC)    Thyroid nodule      History reviewed. No pertinent past medical history.   Past Surgical History:   Procedure Laterality Date    ORIF PROXIMAL TIBIAL PLATEAU FRACTURE Left     US GUIDED THYROID BIOPSY  5/7/2024      Social  History     Tobacco Use    Smoking status: Former     Current packs/day: 0.00     Average packs/day: 0.5 packs/day for 25.2 years (12.6 ttl pk-yrs)     Types: Cigarettes     Start date: 10/20/1975     Quit date: 2001     Years since quittin.8    Smokeless tobacco: Never   Substance Use Topics    Alcohol use: Not Currently     Allergies   Allergen Reactions    Contrast [Iodinated Contrast Media] Shortness Of Breath and Itching         Current Outpatient Medications:     cholecalciferol (VITAMIN D3) 1,000 units tablet, Take 1,000 Units by mouth daily, Disp: , Rfl:     escitalopram (LEXAPRO) 20 mg tablet, Take 20 mg by mouth daily, Disp: , Rfl:     famotidine (PEPCID) 40 MG tablet, Take 40 mg by mouth 2 (two) times a day as needed for heartburn or indigestion, Disp: , Rfl:     fluticasone (FLONASE) 50 mcg/act nasal spray, 1 spray into each nostril daily, Disp: , Rfl:     losartan (COZAAR) 100 MG tablet, Take 100 mg by mouth daily, Disp: , Rfl:     metFORMIN (GLUCOPHAGE) 1000 MG tablet, Take 1,000 mg by mouth 2 (two) times a day with meals, Disp: , Rfl: 1    semaglutide, 0.25 or 0.5 mg/dose, (Ozempic) 2 mg/3 mL injection pen, Inject 0.5 mg once weekly, Disp: 9 mL, Rfl: 2    simvastatin (ZOCOR) 40 mg tablet, Take 40 mg by mouth daily, Disp: , Rfl: 2    Testosterone 30 MG/ACT SOLN, APPLY 3 PUMPS TOTAL DAILY, Disp: 90 mL, Rfl: 1    vardenafil (LEVITRA) 10 MG tablet, Take 1 tablet (10 mg total) by mouth daily as needed for erectile dysfunction, Disp: 10 tablet, Rfl: 0    Blood Glucose Monitoring Suppl (FREESTYLE FREEDOM LITE) w/Device KIT, Test blood sugar once daily, Disp: 1 each, Rfl: 0    glucose blood (FREESTYLE LITE) test strip, Test blood sugar once daily, Disp: 100 each, Rfl: 5    Lancets (FREESTYLE) lancets, Test blood sugar once daily, Disp: 100 each, Rfl: 5    Review of Systems   Constitutional:  Negative for activity change, appetite change, chills, diaphoresis, fatigue, fever and unexpected weight  "change.   HENT:  Negative for sore throat, trouble swallowing and voice change.    Eyes:  Negative for visual disturbance.   Respiratory:  Negative for chest tightness and shortness of breath.    Cardiovascular:  Negative for chest pain, palpitations and leg swelling.   Gastrointestinal:  Negative for abdominal pain, constipation, diarrhea, nausea and vomiting.   Endocrine: Negative for polydipsia, polyphagia and polyuria.   Genitourinary:  Positive for frequency.   Skin:  Negative for color change, pallor, rash and wound.   Neurological:  Positive for numbness (b/l feet). Negative for dizziness, tremors, weakness and light-headedness.   All other systems reviewed and are negative.      Physical Exam:  Body mass index is 32.48 kg/m².  /88   Pulse 78   Ht 6' 2\" (1.88 m)   Wt 115 kg (253 lb)   SpO2 98%   BMI 32.48 kg/m²    Wt Readings from Last 3 Encounters:   10/15/24 115 kg (253 lb)   06/28/24 116 kg (256 lb)   12/01/23 117 kg (258 lb 12.8 oz)       Physical Exam  Vitals reviewed.   Constitutional:       Appearance: Normal appearance. He is obese.   Cardiovascular:      Rate and Rhythm: Normal rate.   Pulmonary:      Effort: Pulmonary effort is normal.   Neurological:      Mental Status: He is alert and oriented to person, place, and time.   Psychiatric:         Mood and Affect: Mood normal.         Thought Content: Thought content normal.       Labs:   Lab Results   Component Value Date    HGBA1C 6.6 (H) 09/13/2024    HGBA1C 6.4 06/28/2024    HGBA1C 6.9 01/17/2024     Lab Results   Component Value Date    CREATININE 1.33 (H) 09/13/2024    CREATININE 1.30 (H) 05/31/2024    CREATININE 1.45 (H) 06/21/2023    BUN 17 09/13/2024    K 4.7 09/13/2024     09/13/2024    CO2 24 09/13/2024     eGFR   Date Value Ref Range Status   09/13/2024 57 (L) >59 mL/min/1.73 Final     Lab Results   Component Value Date    HDL 39 (L) 05/31/2024    TRIG 74 05/31/2024    CHOLHDL 3.3 06/21/2023     Lab Results   Component " Value Date    ALT 20 09/13/2024    AST 20 09/13/2024       Patient Instructions   Double up on Ozempic 1 mg weekly   Continue with metformin at current dose  Continue with Testosterone at current dose  Plan to repeat thyroid US in May    Discussed with the patient and all questioned fully answered. He will call me if any problems arise.    JONH Herzog

## 2024-10-15 NOTE — ASSESSMENT & PLAN NOTE
HGA1C at goal. No BGs to review.   Treatment regimen:   - He is tolerating Ozempic without any issue, however he has not had any weight loss. Will plan to increase to 1 mg weekly. Consider switching to Mounjaro if still no success.   - He did not tolerate SGLT-2 in the past.  - Continue with Metformin at current dose. Will likely decrease dose once he increased ozempic.   - Recommend he check BGs daily.   Discussed risks/complications associated with uncontrolled diabetes.    Advised to adhere to diabetic diet, and recommended staying active/exercising routinely.  Keep carbohydrates consistent to limit blood glucose fluctuations.  Advised to call if blood sugars less than 70 mg/dl or over 300 mg/dl.   Discussed symptoms and treatment of hypoglycemia.    Recommended routine follow-up with podiatry and ophthalmology.   Ordered blood work to complete prior to next visit.   Lab Results   Component Value Date    HGBA1C 6.6 (H) 09/13/2024

## 2024-11-22 ENCOUNTER — OFFICE VISIT (OUTPATIENT)
Dept: ENDOCRINOLOGY | Facility: CLINIC | Age: 74
End: 2024-11-22
Payer: MEDICARE

## 2024-11-22 VITALS
DIASTOLIC BLOOD PRESSURE: 86 MMHG | HEIGHT: 75 IN | SYSTOLIC BLOOD PRESSURE: 126 MMHG | WEIGHT: 261.8 LBS | BODY MASS INDEX: 32.55 KG/M2

## 2024-11-22 DIAGNOSIS — N18.31 TYPE 2 DIABETES MELLITUS WITH STAGE 3A CHRONIC KIDNEY DISEASE, WITHOUT LONG-TERM CURRENT USE OF INSULIN (HCC): Primary | ICD-10-CM

## 2024-11-22 DIAGNOSIS — E29.1 HYPOGONADISM IN MALE: ICD-10-CM

## 2024-11-22 DIAGNOSIS — I10 ESSENTIAL HYPERTENSION: ICD-10-CM

## 2024-11-22 DIAGNOSIS — E78.5 HYPERLIPIDEMIA, UNSPECIFIED HYPERLIPIDEMIA TYPE: ICD-10-CM

## 2024-11-22 DIAGNOSIS — E66.9 OBESITY (BMI 30-39.9): ICD-10-CM

## 2024-11-22 DIAGNOSIS — E11.22 TYPE 2 DIABETES MELLITUS WITH STAGE 3A CHRONIC KIDNEY DISEASE, WITHOUT LONG-TERM CURRENT USE OF INSULIN (HCC): Primary | ICD-10-CM

## 2024-11-22 DIAGNOSIS — E04.1 THYROID NODULE: ICD-10-CM

## 2024-11-22 DIAGNOSIS — Z12.5 ENCOUNTER FOR SCREENING FOR MALIGNANT NEOPLASM OF PROSTATE: ICD-10-CM

## 2024-11-22 PROCEDURE — G2211 COMPLEX E/M VISIT ADD ON: HCPCS | Performed by: INTERNAL MEDICINE

## 2024-11-22 PROCEDURE — 99214 OFFICE O/P EST MOD 30 MIN: CPT | Performed by: INTERNAL MEDICINE

## 2024-11-22 RX ORDER — TIRZEPATIDE 7.5 MG/.5ML
INJECTION, SOLUTION SUBCUTANEOUS
Qty: 2 ML | Refills: 0 | Status: SHIPPED | OUTPATIENT
Start: 2024-11-22

## 2024-11-22 NOTE — ASSESSMENT & PLAN NOTE
Recent lipid panel from May 2024-within normal limits  On Zocor 40 mg daily  Plan  Continue Zocor  Lipid panel check prior to next visit

## 2024-11-22 NOTE — ASSESSMENT & PLAN NOTE
Thyroid ultrasound in April 2024 showed 3.4 cm nodule in the left lower lobe.  FNA in May 2024 showed [place] III.  Afirma testing was benign.  Recent TSH within normal limits.  Plan  Repeat ultrasound yearly because of the size of the nodule-next ultrasound in May 2025

## 2024-11-22 NOTE — ASSESSMENT & PLAN NOTE
Last testosterone level check was in September 2024, total testosterone level of 191 and free testosterone level of 5.6.Reports erectile dysfunction . Libido is normal .  Plan  Testosterone check prior to next visit  Continue the current dose of testosterone

## 2024-11-22 NOTE — ASSESSMENT & PLAN NOTE
Lab Results   Component Value Date    HGBA1C 6.6 (H) 09/13/2024   Currently on metformin 1000 mg twice daily, also on Ozempic 1 mg injections weekly(dose was increased from 0.5 to 1 mg 3 weeks back)  History of diabetic neuropathy  Last HbA1c September 20 24-6.6  Up to date on diabetic eye exam and diabetic foot exam, follows up with podiatry outpatient  Reports no success with weight loss since starting Ozempic in June 2024, would like to discuss other options for weight loss  Did not tolerate SGLT-2 inhibitors in the past   Plan  Will switch patient to Mounjaro, initial dose of 7.5 mg weekly  Continue metformin 1000 mg twice daily  Continue follow-up with podiatrist outpatient  HbA1c check prior to next visit  Daily blood sugar checks  Carbohydrate/sweet restriction  Aerobic exercise at least 30 minutes a day/5 days a week      Orders:    Hemoglobin A1C; Future    Comprehensive metabolic panel; Future    Lipid Panel with Direct LDL reflex; Future    CBC and differential; Future    Albumin / creatinine urine ratio; Future    TSH, 3rd generation; Future    Prolactin; Future    PSA, Total Screen; Future    Tirzepatide (Mounjaro) 7.5 MG/0.5ML SOAJ; 7.5 mg weekly

## 2024-11-22 NOTE — ASSESSMENT & PLAN NOTE
History of type 2 diabetes mellitus and obesity will start  Was started on Ozempic in June 2024 with no significant success in weight loss  Currently on Ozempic 1 mg weekly injections  Plan  Will switch him to Mounjaro, initial dose of 7.5 mg daily  Continue to monitor weight  Life style modifications-Counseled regarding diet and  structured exercise

## 2024-11-22 NOTE — PROGRESS NOTES
Name: Bro Wilson      : 1950      MRN: [id number]  Encounter Provider: Bartolo Vargas DO  Encounter Date: 2024   Encounter department: [facility]  :  Assessment & Plan  Type 2 diabetes mellitus with stage 3a chronic kidney disease, without long-term current use of insulin (HCC)    Lab Results   Component Value Date    HGBA1C 6.6 (H) 2024   Currently on metformin 1000 mg twice daily, also on Ozempic 1 mg injections weekly(dose was increased from 0.5 to 1 mg 3 weeks back)  History of diabetic neuropathy  Last HbA1c -6.6  Up to date on diabetic eye exam and diabetic foot exam, follows up with podiatry outpatient  Reports no success with weight loss since starting Ozempic in 2024, would like to discuss other options for weight loss  Did not tolerate SGLT-2 inhibitors in the past   Plan  Will switch patient to Mounjaro, initial dose of 7.5 mg weekly  Continue metformin 1000 mg twice daily  Continue follow-up with podiatrist outpatient  HbA1c check prior to next visit  Daily blood sugar checks  Carbohydrate/sweet restriction  Aerobic exercise at least 30 minutes a day/5 days a week      Orders:    Hemoglobin A1C; Future    Comprehensive metabolic panel; Future    Lipid Panel with Direct LDL reflex; Future    CBC and differential; Future    Albumin / creatinine urine ratio; Future    TSH, 3rd generation; Future    Prolactin; Future    PSA, Total Screen; Future    Tirzepatide (Mounjaro) 7.5 MG/0.5ML SOAJ; 7.5 mg weekly    Thyroid nodule  Thyroid ultrasound in 2024 showed 3.4 cm nodule in the left lower lobe.  FNA in May 2024 showed [place] III.  Afirma testing was benign.  Recent TSH within normal limits.  Plan  Repeat ultrasound yearly because of the size of the nodule-next ultrasound in May 2025  Hypogonadism in male   Last testosterone level check was in 2024, total testosterone level of 191 and free  testosterone level of 5.6.Reports erectile dysfunction . Libido is normal .  Plan  Testosterone check prior to next visit  Continue the current dose of testosterone  Hyperlipidemia, unspecified hyperlipidemia type  Recent lipid panel from May 2024-within normal limits  On Zocor 40 mg daily  Plan  Continue Zocor  Lipid panel check prior to next visit  Encounter for screening for malignant neoplasm of prostate  Total PSA ordered  Obesity (BMI 30-39.9)  History of type 2 diabetes mellitus and obesity will start  Was started on Ozempic in June 2024 with no significant success in weight loss  Currently on Ozempic 1 mg weekly injections  Plan  Will switch him to Mounjaro, initial dose of 7.5 mg daily  Continue to monitor weight  Life style modifications-Counseled regarding diet and  structured exercise             History of Present Illness     HPI  Bro Wilson is a 74 y.o. male with history of hypertension, hyperlipidemia, hypogonadism, thyroid nodules, diabetes with diabetic  neuropathy and CKD who presents for follow up and discussing about Ozempic.  For diabetes, patient is on metformin 1000 mg twice  daily, also on Ozempic 1 mg injections weekly .  Denies any issues with his current regimen.  Last HbA1c, September 2024 at 6.6.    He is up to date with diabetic eye exam and diabetic foot exam.  Follows up with podiatry outpatient. He was started on Ozempic 0.5 mg in July 2024, dose was increased to 1 mg weekly, one month back in oct 2024 . He reports that his appetite is low, has been eating less but no success with weight loss, he would like to discuss regarding other options for weight loss .  No recent hyperglycemic or hypoglycemic episodes.  He has history of prolactinoma that was treated with  dopamine agonist therapy for several years.  This was stopped after the prolactin levels normalized and MRI in August 2017 showed no evidence of pituitary lesion.  However testosterone level did not improve after  "correction of prolactin, hence he continues to be on testosterone supplementation 30 mg for activation, 3 pumps daily.  Last testosterone level check was in September 2024, total testosterone level of 191 and free testosterone level of 5.6.Reports erectile dysfunction . Libido is normal .  Thyroid ultrasound in April 2024 showed 3.4 cm nodule in the left lower lobe.  FNA in May 2024 showed [place] III.  Afirma testing was benign.  Denies compressive symptoms.      Review of Systems   Constitutional:  Negative for chills and fever.   HENT:  Negative for sore throat.    Eyes:  Negative for visual disturbance.   Respiratory:  Negative for cough and shortness of breath.    Cardiovascular:  Negative for chest pain and palpitations.   Gastrointestinal:  Negative for abdominal pain, constipation, diarrhea, nausea and vomiting.   Genitourinary:  Negative for frequency, hematuria and urgency.   Neurological:  Positive for numbness. Negative for headaches.          Objective   /86 (BP Location: Left arm, Patient Position: Sitting, Cuff Size: Adult)   Ht 6' 2.5\" (1.892 m)   Wt 119 kg (261 lb 12.8 oz)   BMI 33.16 kg/m²      Physical Exam  Constitutional:       Appearance: He is obese.   Eyes:      Extraocular Movements: Extraocular movements intact.      Pupils: Pupils are equal, round, and reactive to light.   Cardiovascular:      Rate and Rhythm: Normal rate and regular rhythm.   Pulmonary:      Effort: Pulmonary effort is normal.      Breath sounds: Normal breath sounds.   Abdominal:      General: Abdomen is flat.      Palpations: Abdomen is soft.   Neurological:      Mental Status: He is alert and oriented to person, place, and time.   Psychiatric:         Mood and Affect: Mood normal.         Behavior: Behavior normal.           "

## 2024-11-29 DIAGNOSIS — E29.1 HYPOGONADISM IN MALE: ICD-10-CM

## 2024-11-29 NOTE — TELEPHONE ENCOUNTER
Medication: Testosterone  PDMP 10/01/2024 10/01/2024 Testosterone (Solution) 90.0 30 30 MG/1.5 ML APRIL UPTON [facility] Medicare 0 / 0 [place]   1 [id number] 08/14/2024 08/14/2024 Testosterone (Solution) 90.0 30 30 MG/1.5 ML APRIL UPTON [facility] Medicare 0 / 0 [place]   1 [id number] 06/26/2024 06/26/2024 Testosterone (Solution) 90.0 20 30 MG/1.5 ML CINDY SMITH [facility] Medicare 0 / 0 [place]

## 2024-12-02 RX ORDER — TESTOSTERONE 30 MG/1.5ML
SOLUTION TOPICAL
Qty: 90 ML | Refills: 0 | Status: SHIPPED | OUTPATIENT
Start: 2024-12-02

## 2024-12-27 DIAGNOSIS — N18.31 TYPE 2 DIABETES MELLITUS WITH STAGE 3A CHRONIC KIDNEY DISEASE, WITHOUT LONG-TERM CURRENT USE OF INSULIN (HCC): ICD-10-CM

## 2024-12-27 DIAGNOSIS — E11.22 TYPE 2 DIABETES MELLITUS WITH STAGE 3A CHRONIC KIDNEY DISEASE, WITHOUT LONG-TERM CURRENT USE OF INSULIN (HCC): ICD-10-CM

## 2024-12-30 RX ORDER — TIRZEPATIDE 7.5 MG/.5ML
INJECTION, SOLUTION SUBCUTANEOUS
Qty: 2 ML | Refills: 5 | Status: SHIPPED | OUTPATIENT
Start: 2024-12-30

## 2025-01-19 DIAGNOSIS — E29.1 HYPOGONADISM IN MALE: ICD-10-CM

## 2025-01-21 RX ORDER — TESTOSTERONE 30 MG/1.5ML
SOLUTION TOPICAL
Qty: 90 ML | Refills: 0 | Status: SHIPPED | OUTPATIENT
Start: 2025-01-21

## 2025-01-21 NOTE — TELEPHONE ENCOUNTER
Patient Id Prescription # Filled Written Drug Label Qty Days Strength MME** Prescriber Pharmacy Payment REFILL #/Auth State Detail  1 [id number] 12/02/2024 12/02/2024 Testosterone (Solution) 90.0 20 30 MG/1.5 ML APRIL SANCHEZ [facility] Medicare 0 / 0 [place]   1 [id number] 10/01/2024 10/01/2024 Testosterone (Solution) 90.0 30 30 MG/1.5 ML APRIL [facility] Medicare 0 / 0 [place]   1 [id number] 08/14/2024 08/14/2024 Testosterone (Solution) 90.0 30 30 MG/1.5 ML [facility] Medicare 0 / 0 [place]   1 [id number] 06/26/2024 06/26/2024 Testosterone (Solution) 90.0 20 30 [facility]1.[facility] 0 / 0 [place]   1 [id number] 04/15/2024 04/15/2024 Testosterone (Solution) 90.0 20 30 MG[facility]1.5 [facility] 0 / 0 [place]   1 [id number] 03/07/2024 03/07/2024 Testosterone (Solution) 90.0 30 30 MG[facility]1.5 [facility] 0 / 0 [place]

## 2025-02-04 ENCOUNTER — TELEPHONE (OUTPATIENT)
Age: 75
End: 2025-02-04

## 2025-02-04 DIAGNOSIS — N18.31 TYPE 2 DIABETES MELLITUS WITH STAGE 3A CHRONIC KIDNEY DISEASE, WITHOUT LONG-TERM CURRENT USE OF INSULIN (HCC): Primary | ICD-10-CM

## 2025-02-04 DIAGNOSIS — E11.22 TYPE 2 DIABETES MELLITUS WITH STAGE 3A CHRONIC KIDNEY DISEASE, WITHOUT LONG-TERM CURRENT USE OF INSULIN (HCC): Primary | ICD-10-CM

## 2025-02-04 RX ORDER — TIRZEPATIDE 5 MG/.5ML
INJECTION, SOLUTION SUBCUTANEOUS
Qty: 2 ML | Refills: 0 | Status: SHIPPED | OUTPATIENT
Start: 2025-02-04

## 2025-02-04 NOTE — TELEPHONE ENCOUNTER
Patient called in states that he has been on the Mounjaro for awhile now but said that he thinks he is having a reaction to it.  He states that his GERD is way worse, and he is having stomach upset.  He currently takes 7.5 mg weekly and asks if he can go on a lower dose. Please advise and call patient.

## 2025-02-04 NOTE — TELEPHONE ENCOUNTER
Yes we can decrease his dose to 5 mg weekly.  Where would he like this prescription sent to?  Thanks.

## 2025-02-04 NOTE — TELEPHONE ENCOUNTER
Patient is agreeable to the 5mg decrease. Patient stated the pharmacy on file for the 7.5mg is good to send to.

## 2025-02-18 DIAGNOSIS — N18.31 TYPE 2 DIABETES MELLITUS WITH STAGE 3A CHRONIC KIDNEY DISEASE, WITHOUT LONG-TERM CURRENT USE OF INSULIN (HCC): ICD-10-CM

## 2025-02-18 DIAGNOSIS — E11.22 TYPE 2 DIABETES MELLITUS WITH STAGE 3A CHRONIC KIDNEY DISEASE, WITHOUT LONG-TERM CURRENT USE OF INSULIN (HCC): ICD-10-CM

## 2025-02-19 RX ORDER — TIRZEPATIDE 5 MG/.5ML
INJECTION, SOLUTION SUBCUTANEOUS
Qty: 2 ML | Refills: 5 | Status: SHIPPED | OUTPATIENT
Start: 2025-02-19

## 2025-03-22 DIAGNOSIS — E29.1 HYPOGONADISM IN MALE: ICD-10-CM

## 2025-03-24 RX ORDER — TESTOSTERONE 30 MG/1.5ML
SOLUTION TOPICAL
Qty: 90 ML | Refills: 0 | Status: SHIPPED | OUTPATIENT
Start: 2025-03-24

## 2025-03-24 NOTE — TELEPHONE ENCOUNTER
Medication:  [facility] 01/21/2025 01/21/2025 Testosterone (Solution) 90.0 20 30 MG/1.5 ML APRIL SANCHEZ [facility] Medicare 0 / 0 [place]   1 [id number] 12/02/2024 12/02/2024 Testosterone (Solution) 90.0 20 30 [facility] 0 / 0 [place]   1 [id number] 10/01/2024 10/01/2024 Testosterone (Solution) 90.0 30 30 MG/1.5 ML [facility] Medicare 0 / 0 [place]   1 [id number] 08/14/2024 08/14/2024 Testosterone (Solution) 90.0 30 30 MG/1.5 ML [facility] Medicare 0 / 0 [place]   1 [id number] 06/26/2024 06/26/2024 Testosterone (Solution) 90.0 20 30 MG[facility]1.5 ML  [facility] Medicare 0 / 0 [place]

## 2025-04-05 ENCOUNTER — HOSPITAL ENCOUNTER (OUTPATIENT)
Dept: CT IMAGING | Facility: HOSPITAL | Age: 75
Discharge: HOME | End: 2025-04-05
Payer: COMMERCIAL

## 2025-04-05 DIAGNOSIS — E78.2 MIXED HYPERLIPIDEMIA: ICD-10-CM

## 2025-04-05 PROCEDURE — 75571 CT HRT W/O DYE W/CA TEST: CPT

## 2025-05-06 DIAGNOSIS — E29.1 HYPOGONADISM IN MALE: ICD-10-CM

## 2025-05-07 RX ORDER — TESTOSTERONE 30 MG/1.5ML
SOLUTION TOPICAL
Qty: 90 ML | Refills: 0 | Status: SHIPPED | OUTPATIENT
Start: 2025-05-07

## 2025-06-18 ENCOUNTER — TELEPHONE (OUTPATIENT)
Age: 75
End: 2025-06-18

## 2025-06-18 NOTE — TELEPHONE ENCOUNTER
Patient calling in regard to lab orders for upcoming appointment    Requesting lab orders be faxed to [facility] in [place] at [phone]    Faxed labs via [employer]  No confirmation yet    Please fax labs

## 2025-06-27 NOTE — TELEPHONE ENCOUNTER
Received a call back from the patient stating [facility] does not have his lab orders. Faxed a copy to [facility] [employer] in [place] at [phone]. Thanks!

## 2025-07-01 ENCOUNTER — OFFICE VISIT (OUTPATIENT)
Dept: ENDOCRINOLOGY | Facility: CLINIC | Age: 75
End: 2025-07-01
Payer: MEDICARE

## 2025-07-01 VITALS
DIASTOLIC BLOOD PRESSURE: 72 MMHG | SYSTOLIC BLOOD PRESSURE: 102 MMHG | HEIGHT: 75 IN | RESPIRATION RATE: 16 BRPM | WEIGHT: 251.2 LBS | OXYGEN SATURATION: 97 % | HEART RATE: 68 BPM | BODY MASS INDEX: 31.23 KG/M2

## 2025-07-01 DIAGNOSIS — E11.22 TYPE 2 DIABETES MELLITUS WITH STAGE 3A CHRONIC KIDNEY DISEASE, WITHOUT LONG-TERM CURRENT USE OF INSULIN (HCC): Primary | ICD-10-CM

## 2025-07-01 DIAGNOSIS — N18.31 TYPE 2 DIABETES MELLITUS WITH STAGE 3A CHRONIC KIDNEY DISEASE, WITHOUT LONG-TERM CURRENT USE OF INSULIN (HCC): Primary | ICD-10-CM

## 2025-07-01 DIAGNOSIS — E29.1 HYPOGONADISM IN MALE: ICD-10-CM

## 2025-07-01 DIAGNOSIS — Z86.018 HISTORY OF PROLACTINOMA: ICD-10-CM

## 2025-07-01 PROCEDURE — 99214 OFFICE O/P EST MOD 30 MIN: CPT

## 2025-07-01 PROCEDURE — G2211 COMPLEX E/M VISIT ADD ON: HCPCS

## 2025-07-01 RX ORDER — TIRZEPATIDE 7.5 MG/.5ML
INJECTION, SOLUTION SUBCUTANEOUS
Qty: 2 ML | Refills: 2 | Status: SHIPPED | OUTPATIENT
Start: 2025-07-01

## 2025-07-01 NOTE — PROGRESS NOTES
Name: Bro Wilson      : 1950      MRN: [id number]  Encounter Provider: JONH Shaw  Encounter Date: 2025   Encounter department: [facility] [place]    Chief Complaint   Patient presents with    Diabetes Type 2    Hypogonadism     Assessment & Plan  1. Diabetes Mellitus: Well-controlled.  - A1c 5.4.  - Increase to Mounjaro 7.5 mg/week to aid with additional weight loss.   - Incorporate protein shakes as meal replacements, engage in regular physical activity (30 min cardio daily, weight training 2-3 times weekly).  - Referral to weight management for further assistance if needed  - Report any adverse effects from increased Mounjaro dosage.    2. Weight Management.  - Diminished effects from current Mounjaro dosage, minimal weight loss.  - Try Mounjaro 7.5 mg again, adjust dosage if side effects persist.  - Referral to weight management for options like very low-calorie diets, metabolism analysis, dietitian support.    3. Neuropathy: Worsening.  - Worsening neuropathy in feet, occasional tingling and numbness.  - Monitor blood sugar levels closely, transient worsening can occur with higher blood sugars.  - Recommend TENN stimulators for symptom management.    4. Thyroid Nodule.  - Complete thyroid ultrasound previously ordered.  - Provide central scheduling phone number in after-visit summary.    5. Health Maintenance.  - Schedule eye exam, provide contact information to ophthalmologist for annual updates.  - Add prolactin test and testosterone level check to next lab panel.  - Order metabolic panel, A1c, and testosterone level check for next visit in 6 months.    6. Hypertension- controlled in office. Continue regimen    7. Hyperlipidemia- LDL at goal (69). New lab results sent to be uploaded into chart. Continue statin.    Follow-up  - Next visit in 6 months.    History of Present Illness  This is a 74-year-old male with a history of type 2  diabetes mellitus, history of prolactinoma, hypogonadism and thyroid nodules.  His diabetes is complicated by diabetic neuropathy.  He does follow with podiatry outpatient.  At the last visit he was switched from Ozempic to Mounjaro due to desire for weight loss.  Per chart review, he did not tolerate SGLT2 inhibitors in the past    Current regimen:  Mounjaro 5 mg/week  Metformin 1000 mg twice daily  Taking losartan and simvastatin    For prolactinoma, he was treated with dopamine agonist therapy for years and given normalization of prolactin levels and negative MRI in August 2017 this was stopped.     Hypogonadism: He is taking testosterone supplementation 30 mg per actuation administered as 3 pumps daily to axilla.    Thyroid nodule: Per chart review, thyroid ultrasound in April 2024 showed 3.4 cm nodule in left lower lobe.  FNA was completed in May 2020 for which showed but stated category 3 Afirma testing was benign.  Repeat thyroid ultrasound has been ordered but has not been completed.     - Scheduled podiatrist appointment tomorrow.  - Last eye exam over a year ago, never diagnosed with diabetic retinopathy.  - No headaches or visual changes.    Weight Management  - Report decreased nighttime appetite due to Mounjaro 5 mg but no significant weight loss.  - Considering increasing dosage to 7.5 mg.    Neuropathy  - Describe occasional tingling and numbness in feet, indicating worsening mild neuropathy.  - Used stimulator once during chiropractic session with improvement.    Thyroid Nodule Evaluation  - Experiencing voice change attributed to postnasal drip.  - No issues with swallowing or breathing.  - Thyroid biopsy performed last year. Benign.    Supplemental information: None    Last Eye Exam: overdue, needs to schedule. Report requested.   Last Foot Exam: sees podiatry, sees regularly.    Health Maintenance   Topic Date Due    Diabetic Eye Exam  06/03/2022    Diabetic Foot Exam  10/21/2023           Review  "of Systems   Constitutional:  Negative for chills and fever.   HENT:  Negative for ear pain and sore throat.    Eyes:  Negative for pain and visual disturbance.   Respiratory:  Negative for cough and shortness of breath.    Cardiovascular:  Negative for chest pain and palpitations.   Gastrointestinal:  Negative for abdominal pain and vomiting.   Genitourinary:  Negative for dysuria and hematuria.   Musculoskeletal:  Negative for arthralgias and back pain.   Skin:  Negative for color change and rash.   Neurological:  Negative for seizures and syncope.   All other systems reviewed and are negative.   as per HPI       Medical History Reviewed by provider this encounter:     .    Objective   /72 (BP Location: Left arm, Patient Position: Sitting, Cuff Size: Adult)   Pulse 68   Resp 16   Ht 6' 2.5\" (1.892 m)   Wt 114 kg (251 lb 3.2 oz)   SpO2 97%   BMI 31.82 kg/m²      Body mass index is 31.82 kg/m².  Wt Readings from Last 3 Encounters:   07/01/25 114 kg (251 lb 3.2 oz)   11/22/24 119 kg (261 lb 12.8 oz)   10/15/24 115 kg (253 lb)     Physical Exam  - General: Well and in no acute distress  - Respiratory: Clear breath sounds bilaterally  Physical Exam  Vitals reviewed.   HENT:      Head: Normocephalic and atraumatic.     Cardiovascular:      Rate and Rhythm: Normal rate.   Pulmonary:      Effort: Pulmonary effort is normal.     Neurological:      Mental Status: He is alert.       Physical Exam      Results  - Labs:    - A1c: 5.4  Labs:   Lab Results   Component Value Date    HGBA1C 6.6 (H) 09/13/2024    HGBA1C 6.4 06/28/2024    HGBA1C 6.9 01/17/2024     Lab Results   Component Value Date    CREATININE 1.33 (H) 09/13/2024    CREATININE 1.30 (H) 05/31/2024    CREATININE 1.45 (H) 06/21/2023    BUN 17 09/13/2024    K 4.7 09/13/2024     09/13/2024    CO2 24 09/13/2024     eGFR   Date Value Ref Range Status   09/13/2024 57 (L) >59 mL/min/1.73 Final     Lab Results   Component Value Date    HDL 39 (L) " "05/31/2024    TRIG 74 05/31/2024    CHOLHDL 3.3 06/21/2023     Lab Results   Component Value Date    ALT 20 09/13/2024    AST 20 09/13/2024     No results found for: \"[id number]\"    Patient Instructions   Schedule thyroid ultrasound at [phone]    Discussed with the patient and all questioned fully answered. He will call me if any problems arise.  "

## 2025-07-11 DIAGNOSIS — E29.1 HYPOGONADISM IN MALE: ICD-10-CM

## 2025-07-15 DIAGNOSIS — E29.1 HYPOGONADISM IN MALE: ICD-10-CM

## 2025-07-15 RX ORDER — TESTOSTERONE 30 MG/1.5ML
SOLUTION TOPICAL
Qty: 90 ML | Refills: 0 | OUTPATIENT
Start: 2025-07-15

## 2025-07-15 RX ORDER — TESTOSTERONE 30 MG/1.5ML
SOLUTION TOPICAL
Qty: 90 ML | Refills: 0 | Status: SHIPPED | OUTPATIENT
Start: 2025-07-15